# Patient Record
Sex: FEMALE | Race: WHITE | Employment: UNEMPLOYED | URBAN - METROPOLITAN AREA
[De-identification: names, ages, dates, MRNs, and addresses within clinical notes are randomized per-mention and may not be internally consistent; named-entity substitution may affect disease eponyms.]

---

## 2017-05-15 VITALS — WEIGHT: 155 LBS | BODY MASS INDEX: 31.25 KG/M2 | HEIGHT: 59 IN

## 2017-05-15 RX ORDER — BUTALBITAL, ASPIRIN, AND CAFFEINE 325; 50; 40 MG/1; MG/1; MG/1
1 CAPSULE ORAL
COMMUNITY
End: 2019-08-01

## 2017-05-18 ENCOUNTER — ANESTHESIA (OUTPATIENT)
Dept: SURGERY | Age: 52
End: 2017-05-18

## 2017-05-18 ENCOUNTER — HOSPITAL ENCOUNTER (OUTPATIENT)
Age: 52
Setting detail: OUTPATIENT SURGERY
Discharge: HOME | End: 2017-05-18
Attending: ANESTHESIOLOGY | Admitting: ANESTHESIOLOGY

## 2017-05-18 ENCOUNTER — ANESTHESIA EVENT (OUTPATIENT)
Dept: SURGERY | Age: 52
End: 2017-05-18

## 2017-05-18 NOTE — PERIOP NOTES
Dr. Bernetta Landau noticed patient was allergic to Prednisone upon entering medicine for procedure. Dr. Bernetta Landau at bedside talking to patient. Procedure cancelled due to pt allergy to Prednisone.

## 2019-08-01 ENCOUNTER — OFFICE VISIT (OUTPATIENT)
Dept: FAMILY MEDICINE CLINIC | Facility: CLINIC | Age: 54
End: 2019-08-01

## 2019-08-01 VITALS
DIASTOLIC BLOOD PRESSURE: 64 MMHG | WEIGHT: 167.8 LBS | TEMPERATURE: 98.2 F | HEIGHT: 59 IN | BODY MASS INDEX: 33.83 KG/M2 | HEART RATE: 85 BPM | SYSTOLIC BLOOD PRESSURE: 136 MMHG | RESPIRATION RATE: 12 BRPM | OXYGEN SATURATION: 97 %

## 2019-08-01 DIAGNOSIS — Z13.29 THYROID DISORDER SCREEN: ICD-10-CM

## 2019-08-01 DIAGNOSIS — Z98.84 H/O GASTRIC BYPASS: ICD-10-CM

## 2019-08-01 DIAGNOSIS — R60.0 LOWER EXTREMITY EDEMA: ICD-10-CM

## 2019-08-01 DIAGNOSIS — Z13.6 ENCOUNTER FOR LIPID SCREENING FOR CARDIOVASCULAR DISEASE: ICD-10-CM

## 2019-08-01 DIAGNOSIS — J45.40 MODERATE PERSISTENT ASTHMA, UNSPECIFIED WHETHER COMPLICATED: Primary | ICD-10-CM

## 2019-08-01 DIAGNOSIS — Z13.21 ENCOUNTER FOR VITAMIN DEFICIENCY SCREENING: ICD-10-CM

## 2019-08-01 DIAGNOSIS — Z13.220 ENCOUNTER FOR LIPID SCREENING FOR CARDIOVASCULAR DISEASE: ICD-10-CM

## 2019-08-01 DIAGNOSIS — M79.7 FIBROMYALGIA: ICD-10-CM

## 2019-08-01 DIAGNOSIS — M79.10 MYALGIA: ICD-10-CM

## 2019-08-01 DIAGNOSIS — R25.2 MUSCLE CRAMPS: ICD-10-CM

## 2019-08-01 RX ORDER — CYCLOBENZAPRINE HCL 5 MG
5 TABLET ORAL
Qty: 30 TAB | Refills: 0 | Status: SHIPPED | OUTPATIENT
Start: 2019-08-01 | End: 2019-08-01

## 2019-08-01 RX ORDER — CYCLOBENZAPRINE HCL 5 MG
5 TABLET ORAL
Qty: 30 TAB | Refills: 0 | Status: SHIPPED | OUTPATIENT
Start: 2019-08-01 | End: 2020-01-19

## 2019-08-01 NOTE — PROGRESS NOTES
Cathi Greer presents today for   Chief Complaint   Patient presents with   [address]     lmuscle in legs cramps        Is someone accompanying this pt? no    Is the patient using any DME equipment during OV? no    Depression Screening:  3 most recent PHQ Screens 8/1/2019   Little interest or pleasure in doing things Not at all   Feeling down, depressed, irritable, or hopeless Not at all   Total Score PHQ 2 0       Learning Assessment:  Learning Assessment 8/1/2019   PRIMARY LEARNER Patient   HIGHEST LEVEL OF EDUCATION - PRIMARY LEARNER  2 YEARS OF COLLEGE   BARRIERS PRIMARY LEARNER NONE   CO-LEARNER CAREGIVER No   PRIMARY LANGUAGE ENGLISH   LEARNER PREFERENCE PRIMARY DEMONSTRATION   ANSWERED BY patient   RELATIONSHIP SELF       Abuse Screening:  No flowsheet data found. Fall Risk  No flowsheet data found. Health Maintenance reviewed and discussed and ordered per Provider. Health Maintenance Due   Topic Date Due    Hepatitis C Screening  1965    DTaP/Tdap/Td series (1 - Tdap) 09/25/1984    PAP AKA CERVICAL CYTOLOGY  09/25/1984    Shingrix Vaccine Age 50> (1 of 2) 09/25/2013    FOBT Q 1 YEAR AGE 50-75  09/25/2013    BREAST CANCER SCRN MAMMOGRAM  09/05/2015    Influenza Age 9 to Adult  08/01/2019           Coordination of Care:  1. Have you been to the ER, urgent care clinic since your last visit? Hospitalized since your last visit? no    2. Have you seen or consulted any other health care providers outside of the [address] [place] [address] since your last visit? Include any pap smears or colon screening.  no

## 2019-08-01 NOTE — PROGRESS NOTES
Macy Speaker is a 52 y.o. female presenting today for Establish Care (lmuscle in legs cramps )    HPI:  aMcy Burrows presents to the office today to establish care with the practice. Patient has a PMH for asthma, arthritis, migraines, anemia, cervical neck pain with evidence of disc disease and fibromyalgia. She presents today complaining of lower extremity myalgia. She also has intermitten upper extremity myalgia. She denies any known injur and describes the pain and muscle cramps. She reports she has generalized body discomfort most days. She reports, \" I am tired of hurting\". She notes increased pain at night when she is lying down. She has a history of fibromyalgia but does not oumou any medications for her symptoms. Her blood pressure is controlled today and she is negative for chest pain, palpation or dyspnea. She is complaining of head pressure and believes it may be related to her sinus. She is negative for cough or wheezing. She denies any dizziness. Review of Systems   Respiratory: Negative for cough and sputum production. Cardiovascular: Negative for chest pain and palpitations. Gastrointestinal: Negative for abdominal pain, nausea and vomiting. Musculoskeletal: Positive for joint pain and myalgias. Neurological: Positive for headaches (head pressure). Allergies   Allergen Reactions    Imitrex [Sumatriptan Succinate] Other (comments)     \"burned my skin\"    Penicillins Hives    Prednisone Hives       Current Outpatient Medications   Medication Sig Dispense Refill    cyclobenzaprine (FLEXERIL) 5 mg tablet Take 1 Tab by mouth three (3) times daily (with meals). 30 Tab 0    Comp. Stocking,Knee,Regular,Lrg misc 20-30 mm hg 2 Package 1    albuterol (PROVENTIL HFA) 90 mcg/actuation inhaler Take 2 Puffs by inhalation.  naproxen sodium (ALEVE) 220 mg cap Take  by mouth.  meclizine (ANTIVERT) 25 mg tablet Take 1 Tab by mouth three (3) times daily as needed.  9 Tab 0       Past Medical History:   Diagnosis Date    Anemia     Arthritis     Asthma     Cervical neck pain with evidence of disc disease     Fibromyalgia     Migraines        Past Surgical History:   Procedure Laterality Date    ABDOMEN SURGERY PROC UNLISTED      HX CHOLECYSTECTOMY      HX GASTRIC BYPASS      HX GYN      HX HYSTERECTOMY      HX ORTHOPAEDIC      L arm has steel bar 2007       Social History     Socioeconomic History    Marital status: UNKNOWN     Spouse name: Not on file    Number of children: Not on file    Years of education: Not on file    Highest education level: Not on file   Occupational History    Not on file   Social Needs    Financial resource strain: Not on file    Food insecurity:     Worry: Not on file     Inability: Not on file    Transportation needs:     Medical: Not on file     Non-medical: Not on file   Tobacco Use    Smoking status: Former Smoker     Packs/day: 0.50     Last attempt to quit: 11/1/2015     Years since quitting: 3.7    Smokeless tobacco: Never Used   Substance and Sexual Activity    Alcohol use: No    Drug use: No    Sexual activity: Not Currently   Lifestyle    Physical activity:     Days per week: Not on file     Minutes per session: Not on file    Stress: Not on file   Relationships    Social connections:     Talks on phone: Not on file     Gets together: Not on file     Attends [employer] service: Not on file     Active member of club or organization: Not on file     Attends meetings of clubs or organizations: Not on file     Relationship status: Not on file    Intimate partner violence:     Fear of current or ex partner: Not on file     Emotionally abused: Not on file     Physically abused: Not on file     Forced sexual activity: Not on file   Other Topics Concern    Not on file   Social History Narrative    Not on file       Patient does not have an advanced directive on file    Vitals:    08/01/19 1349   BP: 136/64   Pulse: 85   Resp: 12   Temp: 98.2 °F (36.8 °C)   TempSrc: Oral   SpO2: 97%   Weight: 167 lb 12.8 oz (76.1 kg)   Height: 4' 11\" (1.499 m)   PainSc:   7       Physical Exam   Constitutional: She is oriented to person, place, and time. No distress. Cardiovascular: Normal rate, regular rhythm and normal heart sounds. Pulmonary/Chest: Effort normal and breath sounds normal.   Musculoskeletal: She exhibits edema (bilateral ankes). She exhibits no tenderness. Neurological: She is alert and oriented to person, place, and time. Gait normal.   Skin: Skin is warm and dry. Nursing note and vitals reviewed. No visits with results within 3 Month(s) from this visit. Latest known visit with results is:   Admission on 11/22/2015, Discharged on 11/22/2015   Component Date Value Ref Range Status    WBC 11/22/2015 9.5  4.6 - 13.2 K/uL Final    RBC 11/22/2015 4.13* 4.20 - 5.30 M/uL Final    HGB 11/22/2015 9.6* 12.0 - 16.0 g/dL Final    HCT 11/22/2015 31.9* 35.0 - 45.0 % Final    MCV 11/22/2015 77.2  74.0 - 97.0 FL Final    MCH 11/22/2015 23.2* 24.0 - 34.0 PG Final    MCHC 11/22/2015 30.1* 31.0 - 37.0 g/dL Final    RDW 11/22/2015 17.4* 11.6 - 14.5 % Final    PLATELET 46/30/7211 152  135 - 420 K/uL Final    MPV 11/22/2015 10.8  9.2 - 11.8 FL Final    NEUTROPHILS 11/22/2015 70  40 - 73 % Final    LYMPHOCYTES 11/22/2015 21  21 - 52 % Final    MONOCYTES 11/22/2015 7  3 - 10 % Final    EOSINOPHILS 11/22/2015 1  0 - 5 % Final    BASOPHILS 11/22/2015 1  0 - 2 % Final    ABS. NEUTROPHILS 11/22/2015 6.6  1.8 - 8.0 K/UL Final    ABS. LYMPHOCYTES 11/22/2015 2.0  0.9 - 3.6 K/UL Final    ABS. MONOCYTES 11/22/2015 0.7  0.05 - 1.2 K/UL Final    ABS. EOSINOPHILS 11/22/2015 0.1  0.0 - 0.4 K/UL Final    ABS.  BASOPHILS 11/22/2015 0.1* 0.0 - 0.06 K/UL Final    DF 11/22/2015 AUTOMATED    Final    D DIMER 11/22/2015 0.49* <0.46 ug/ml(FEU) Final    Comment: (NOTE)  A D-Dimer result less than 0.5 ug/mL FEU combined with a low clinical   pretest probability of DVT and/or PE has a negative predictive value   of %. The positive predictive value is 50% or less.  CK 11/22/2015 70  26 - 192 U/L Final    CK - MB 11/22/2015 0.7  0.5 - 3.6 ng/ml Final    CK-MB Index 11/22/2015 1.0  0.0 - 4.0 % Final    Troponin-I, QT 11/22/2015 <0.02  0.00 - 0.06 NG/ML Final    Comment: The presence of detectable troponin above the reference range indicates myocardial injury which may be due to ischemia, myocarditis, trauma, etc.  Clinical correlation is necessary to establish the significance of this finding. Sequential testing is recommended to determine if the typical rise and fall of cTnI is demonstrated. Note:  Cardiac troponin I has a relatively long half life and may be present well after the CK MB has returned to baseline. The reference range is based on the 99th percentile of the referent population.  Sodium 11/22/2015 141  136 - 145 mmol/L Final    Potassium 11/22/2015 4.4  3.5 - 5.5 mmol/L Final    Chloride 11/22/2015 108  100 - 108 mmol/L Final    CO2 11/22/2015 25  21 - 32 mmol/L Final    Anion gap 11/22/2015 8  3.0 - 18 mmol/L Final    Glucose 11/22/2015 99  74 - 99 mg/dL Final    BUN 11/22/2015 17  7.0 - 18 MG/DL Final    Creatinine 11/22/2015 0.80  0.6 - 1.3 MG/DL Final    BUN/Creatinine ratio 11/22/2015 21* 12 - 20   Final    GFR est AA 11/22/2015 >60  >60 ml/min/1.73m2 Final    GFR est non-AA 11/22/2015 >60  >60 ml/min/1.73m2 Final    Comment: (NOTE)  Estimated GFR is calculated using the Modification of Diet in Renal   Disease (MDRD) Study equation, reported for both  Americans   (GFRAA) and non- Americans (GFRNA), and normalized to 1.73m2   body surface area. The physician must decide which value applies to   the patient. The MDRD study equation should only be used in   individuals age 25 or older.  It has not been validated for the   following: pregnant women, patients with serious comorbid conditions,   or on certain medications, or persons with extremes of body size,   muscle mass, or nutritional status.  Calcium 11/22/2015 8.6  8.5 - 10.1 MG/DL Final    Ventricular Rate 11/22/2015 67  BPM Final    Atrial Rate 11/22/2015 67  BPM Final    P-R Interval 11/22/2015 114  ms Final    QRS Duration 11/22/2015 74  ms Final    Q-T Interval 11/22/2015 386  ms Final    QTC Calculation (Bezet) 11/22/2015 407  ms Final    Calculated P Axis 11/22/2015 46  degrees Final    Calculated R Axis 11/22/2015 38  degrees Final    Calculated T Axis 11/22/2015 27  degrees Final    Diagnosis 11/22/2015    Final                    Value:Normal sinus rhythm  Normal ECG  When compared with ECG of 23-JUN-2015 15:24,  No significant change was found  Confirmed by [id number], Elvin Bello MD ([id number]),  Carline Cummins ([id number]) on   11/23/2015 6:22:01 AM      Color 11/22/2015 YELLOW    Final    Appearance 11/22/2015 CLEAR    Final    Specific gravity 11/22/2015 1.020  1.003 - 1.030   Final    pH (UA) 11/22/2015 5.5  5.0 - 8.0   Final    Protein 11/22/2015 NEGATIVE   NEG mg/dL Final    Glucose 11/22/2015 NEGATIVE   NEG mg/dL Final    Ketone 11/22/2015 NEGATIVE   NEG mg/dL Final    Bilirubin 11/22/2015 NEGATIVE   NEG   Final    Blood 11/22/2015 TRACE* NEG   Final    Urobilinogen 11/22/2015 0.2  0.2 - 1.0 EU/dL Final    Nitrites 11/22/2015 POSITIVE* NEG   Final    Leukocyte Esterase 11/22/2015 TRACE* NEG   Final    WBC 11/22/2015 1 to 3  0 - 4 /hpf Final    RBC 11/22/2015 0 to 1  0 - 5 /hpf Final    Epithelial cells 11/22/2015 FEW  0 - 5 /lpf Final    Bacteria 11/22/2015 3+* NEG /hpf Final       .No results found for any visits on 08/01/19. Assessment / Plan:      ICD-10-CM ICD-9-CM    1. Moderate persistent asthma, unspecified whether complicated U90.88 937.74    2. Fibromyalgia M79.7 729.1 CANCELED: REFERRAL TO PAIN MANAGEMENT   3.  Encounter for lipid screening for cardiovascular disease V86.675 A03.76 METABOLIC PANEL, COMPREHENSIVE    Z13.6 V81.2 LIPID PANEL      CBC WITH AUTOMATED DIFF   4. Thyroid disorder screen Z13.29 V77.0 TSH 3RD GENERATION   5. Encounter for vitamin deficiency screening Z13.21 V77.99 VITAMIN D, 25 HYDROXY   6. Myalgia M79.10 729.1 SED RATE (ESR)      C REACTIVE PROTEIN, QT   7. Lower extremity edema R60.0 782. 3 Comp. Stocking,Knee,Regular,Lrg misc      DISCONTINUED: Comp. Stocking,Knee,Regular,Lrg misc   8. Muscle cramps R25.2 729.82 cyclobenzaprine (FLEXERIL) 5 mg tablet      DISCONTINUED: cyclobenzaprine (FLEXERIL) 5 mg tablet   9. H/O gastric bypass Z98.84 V45.86 FERRITIN     Follow-up and Dispositions    · Return in about 1 month (around 8/29/2019). Fibromyalgia- patient declined referral to pain management  Fasting labs ordered  Screen for TSH  H/o gastric bypass- feritin ordered- patient instructed to avoid NSAIDS (Aleve, advil, Motrin)  Sed Rate and CRP ordered      I asked the patient if she  had any questions and answered her  questions. The patient stated that she understands the treatment plan and agrees with the treatment plan    This document was created with a voice activated dictation system and may contain transcription errors.

## 2019-08-22 ENCOUNTER — HOSPITAL ENCOUNTER (OUTPATIENT)
Dept: LAB | Age: 54
Discharge: HOME | End: 2019-08-22

## 2019-08-22 LAB — XX-LABCORP SPECIMEN COL,LCBCF: NORMAL

## 2019-08-22 PROCEDURE — 99001 SPECIMEN HANDLING PT-LAB: CPT

## 2019-08-23 LAB
25(OH)D3+25(OH)D2 SERPL-MCNC: 26.5 NG/ML (ref 30–100)
ALBUMIN SERPL-MCNC: 3.7 G/DL (ref 3.5–5.5)
ALBUMIN/GLOB SERPL: 1.4 {RATIO} (ref 1.2–2.2)
ALP SERPL-CCNC: 109 IU/L (ref 39–117)
ALT SERPL-CCNC: 27 IU/L (ref 0–32)
AST SERPL-CCNC: 30 IU/L (ref 0–40)
BASOPHILS # BLD AUTO: 0 X10E3/UL (ref 0–0.2)
BASOPHILS NFR BLD AUTO: 0 %
BILIRUB SERPL-MCNC: 0.3 MG/DL (ref 0–1.2)
BUN SERPL-MCNC: 13 MG/DL (ref 6–24)
BUN/CREAT SERPL: 22 (ref 9–23)
CALCIUM SERPL-MCNC: 8.4 MG/DL (ref 8.7–10.2)
CHLORIDE SERPL-SCNC: 106 MMOL/L (ref 96–106)
CHOLEST SERPL-MCNC: 115 MG/DL (ref 100–199)
CO2 SERPL-SCNC: 23 MMOL/L (ref 20–29)
CREAT SERPL-MCNC: 0.59 MG/DL (ref 0.57–1)
CRP SERPL-MCNC: <1 MG/L (ref 0–10)
EOSINOPHIL # BLD AUTO: 0.1 X10E3/UL (ref 0–0.4)
EOSINOPHIL NFR BLD AUTO: 1 %
ERYTHROCYTE [DISTWIDTH] IN BLOOD BY AUTOMATED COUNT: 16 % (ref 12.3–15.4)
ERYTHROCYTE [SEDIMENTATION RATE] IN BLOOD BY WESTERGREN METHOD: 40 MM/HR (ref 0–40)
FERRITIN SERPL-MCNC: 6 NG/ML (ref 15–150)
GLOBULIN SER CALC-MCNC: 2.7 G/DL (ref 1.5–4.5)
GLUCOSE SERPL-MCNC: 92 MG/DL (ref 65–99)
HCT VFR BLD AUTO: 31.2 % (ref 34–46.6)
HDLC SERPL-MCNC: 33 MG/DL
HGB BLD-MCNC: 8.7 G/DL (ref 11.1–15.9)
IMM GRANULOCYTES # BLD AUTO: 0 X10E3/UL (ref 0–0.1)
IMM GRANULOCYTES NFR BLD AUTO: 0 %
LDLC SERPL CALC-MCNC: 71 MG/DL (ref 0–99)
LYMPHOCYTES # BLD AUTO: 1.7 X10E3/UL (ref 0.7–3.1)
LYMPHOCYTES NFR BLD AUTO: 24 %
MCH RBC QN AUTO: 21.8 PG (ref 26.6–33)
MCHC RBC AUTO-ENTMCNC: 27.9 G/DL (ref 31.5–35.7)
MCV RBC AUTO: 78 FL (ref 79–97)
MONOCYTES # BLD AUTO: 0.5 X10E3/UL (ref 0.1–0.9)
MONOCYTES NFR BLD AUTO: 6 %
NEUTROPHILS # BLD AUTO: 5 X10E3/UL (ref 1.4–7)
NEUTROPHILS NFR BLD AUTO: 69 %
PLATELET # BLD AUTO: 378 X10E3/UL (ref 150–450)
POTASSIUM SERPL-SCNC: 4.4 MMOL/L (ref 3.5–5.2)
PROT SERPL-MCNC: 6.4 G/DL (ref 6–8.5)
RBC # BLD AUTO: 4 X10E6/UL (ref 3.77–5.28)
SODIUM SERPL-SCNC: 143 MMOL/L (ref 134–144)
SPECIMEN STATUS REPORT, ROLRST: NORMAL
TRIGL SERPL-MCNC: 56 MG/DL (ref 0–149)
TSH SERPL DL<=0.005 MIU/L-ACNC: 3.18 UIU/ML (ref 0.45–4.5)
VLDLC SERPL CALC-MCNC: 11 MG/DL (ref 5–40)
WBC # BLD AUTO: 7.3 X10E3/UL (ref 3.4–10.8)

## 2019-08-28 DIAGNOSIS — D50.9 IRON DEFICIENCY ANEMIA, UNSPECIFIED IRON DEFICIENCY ANEMIA TYPE: Primary | ICD-10-CM

## 2019-08-28 RX ORDER — LANOLIN ALCOHOL/MO/W.PET/CERES
325 CREAM (GRAM) TOPICAL 2 TIMES DAILY
Qty: 60 TAB | Refills: 2 | Status: SHIPPED | OUTPATIENT
Start: 2019-08-28 | End: 2020-01-19

## 2019-08-28 NOTE — PROGRESS NOTES
Please let the patient know I would like to see her in the office today or the beginning of next week to discuss labs.  . Ferrous Sulfate called to her pharmacist.

## 2019-10-29 ENCOUNTER — APPOINTMENT (OUTPATIENT)
Dept: GENERAL RADIOLOGY | Age: 54
End: 2019-10-29
Attending: EMERGENCY MEDICINE
Payer: COMMERCIAL

## 2019-10-29 ENCOUNTER — HOSPITAL ENCOUNTER (EMERGENCY)
Age: 54
Discharge: HOME | End: 2019-10-29
Attending: EMERGENCY MEDICINE
Payer: COMMERCIAL

## 2019-10-29 VITALS
OXYGEN SATURATION: 100 % | TEMPERATURE: 98.5 F | WEIGHT: 160 LBS | DIASTOLIC BLOOD PRESSURE: 75 MMHG | SYSTOLIC BLOOD PRESSURE: 152 MMHG | BODY MASS INDEX: 32.32 KG/M2 | RESPIRATION RATE: 18 BRPM | HEART RATE: 74 BPM

## 2019-10-29 DIAGNOSIS — R07.89 CHEST WALL PAIN: Primary | ICD-10-CM

## 2019-10-29 LAB
ALBUMIN SERPL-MCNC: 3.4 G/DL (ref 3.4–5)
ALBUMIN/GLOB SERPL: 0.9 {RATIO} (ref 0.8–1.7)
ALP SERPL-CCNC: 105 U/L (ref 45–117)
ALT SERPL-CCNC: 50 U/L (ref 13–56)
ANION GAP SERPL CALC-SCNC: 6 MMOL/L (ref 3–18)
AST SERPL-CCNC: 34 U/L (ref 10–38)
ATRIAL RATE: 86 BPM
BASOPHILS # BLD: 0 K/UL (ref 0–0.1)
BASOPHILS NFR BLD: 1 % (ref 0–2)
BILIRUB SERPL-MCNC: 0.2 MG/DL (ref 0.2–1)
BUN SERPL-MCNC: 15 MG/DL (ref 7–18)
BUN/CREAT SERPL: 18 (ref 12–20)
CALCIUM SERPL-MCNC: 8.4 MG/DL (ref 8.5–10.1)
CALCULATED P AXIS, ECG09: 80 DEGREES
CALCULATED R AXIS, ECG10: 29 DEGREES
CALCULATED T AXIS, ECG11: 16 DEGREES
CHLORIDE SERPL-SCNC: 113 MMOL/L (ref 100–111)
CO2 SERPL-SCNC: 24 MMOL/L (ref 21–32)
CREAT SERPL-MCNC: 0.84 MG/DL (ref 0.6–1.3)
DIAGNOSIS, 93000: NORMAL
DIFFERENTIAL METHOD BLD: ABNORMAL
EOSINOPHIL # BLD: 0.1 K/UL (ref 0–0.4)
EOSINOPHIL NFR BLD: 1 % (ref 0–5)
ERYTHROCYTE [DISTWIDTH] IN BLOOD BY AUTOMATED COUNT: 15.8 % (ref 11.6–14.5)
GLOBULIN SER CALC-MCNC: 4 G/DL (ref 2–4)
GLUCOSE SERPL-MCNC: 96 MG/DL (ref 74–99)
HCT VFR BLD AUTO: 27.8 % (ref 35–45)
HGB BLD-MCNC: 8 G/DL (ref 12–16)
LYMPHOCYTES # BLD: 1.8 K/UL (ref 0.9–3.6)
LYMPHOCYTES NFR BLD: 22 % (ref 21–52)
MAGNESIUM SERPL-MCNC: 2 MG/DL (ref 1.6–2.6)
MCH RBC QN AUTO: 20.7 PG (ref 24–34)
MCHC RBC AUTO-ENTMCNC: 28.8 G/DL (ref 31–37)
MCV RBC AUTO: 71.8 FL (ref 74–97)
MONOCYTES # BLD: 0.5 K/UL (ref 0.05–1.2)
MONOCYTES NFR BLD: 6 % (ref 3–10)
NEUTS SEG # BLD: 5.5 K/UL (ref 1.8–8)
NEUTS SEG NFR BLD: 70 % (ref 40–73)
P-R INTERVAL, ECG05: 116 MS
PLATELET # BLD AUTO: 396 K/UL (ref 135–420)
PMV BLD AUTO: 10.1 FL (ref 9.2–11.8)
POTASSIUM SERPL-SCNC: 4.2 MMOL/L (ref 3.5–5.5)
PROT SERPL-MCNC: 7.4 G/DL (ref 6.4–8.2)
Q-T INTERVAL, ECG07: 378 MS
QRS DURATION, ECG06: 70 MS
QTC CALCULATION (BEZET), ECG08: 452 MS
RBC # BLD AUTO: 3.87 M/UL (ref 4.2–5.3)
SODIUM SERPL-SCNC: 143 MMOL/L (ref 136–145)
TROPONIN I SERPL-MCNC: <0.02 NG/ML (ref 0–0.04)
VENTRICULAR RATE, ECG03: 86 BPM
WBC # BLD AUTO: 7.9 K/UL (ref 4.6–13.2)

## 2019-10-29 PROCEDURE — 71045 X-RAY EXAM CHEST 1 VIEW: CPT

## 2019-10-29 PROCEDURE — 93005 ELECTROCARDIOGRAM TRACING: CPT

## 2019-10-29 PROCEDURE — 84484 ASSAY OF TROPONIN QUANT: CPT

## 2019-10-29 PROCEDURE — 83735 ASSAY OF MAGNESIUM: CPT

## 2019-10-29 PROCEDURE — 99283 EMERGENCY DEPT VISIT LOW MDM: CPT

## 2019-10-29 PROCEDURE — 80053 COMPREHEN METABOLIC PANEL: CPT

## 2019-10-29 PROCEDURE — 85025 COMPLETE CBC W/AUTO DIFF WBC: CPT

## 2019-10-29 PROCEDURE — 94760 N-INVAS EAR/PLS OXIMETRY 1: CPT

## 2019-10-29 RX ORDER — GUAIFENESIN 100 MG/5ML
324 LIQUID (ML) ORAL
Status: DISCONTINUED | OUTPATIENT
Start: 2019-10-29 | End: 2019-10-29 | Stop reason: HOSPADM

## 2019-10-29 NOTE — DISCHARGE INSTRUCTIONS

## 2019-10-29 NOTE — ED NOTES
I performed a brief evaluation, including history and physical, of the patient here in triage and I have determined that pt will need further treatment and evaluation from the main side ER physician. I have placed initial orders to help in expediting patients care. October 29, 2019 at 2:54 PM - BOZENA Aguero        There were no vitals taken for this visit.

## 2019-10-29 NOTE — ED PROVIDER NOTES
EMERGENCY DEPARTMENT HISTORY AND PHYSICAL EXAM    5:38 PM      Date: 10/29/2019  Patient Name: Efra Duckworth    History of Presenting Illness     Chief Complaint   Patient presents with    Chest Pain    Shortness of Breath     History Provided By: patient    Additional History (Context): Efra Duckworth is a 62 y.o. female presents with chest pain for several days. Last 5 to 10 minutes comes on without exertion nonpleuritic. Last 5 to 10 minutes and resolve spontaneously. No prior heart history or VTE. At the time of the exam patient is pain-free. She has a tender placed on her left chest that reproduces her pain. .. PCP: Enriqueta Leal MD    Chief Complaint:   Duration:    Timing:    Location:   Quality:   Severity:   Modifying Factors:   Associated Symptoms:       Current Facility-Administered Medications   Medication Dose Route Frequency Provider Last Rate Last Dose    aspirin chewable tablet 324 mg  324 mg Oral NOW Caroline Fernandez PA         Current Outpatient Medications   Medication Sig Dispense Refill    ferrous sulfate 325 mg (65 mg iron) tablet Take 1 Tab by mouth two (2) times a day. 60 Tab 2    cyclobenzaprine (FLEXERIL) 5 mg tablet Take 1 Tab by mouth three (3) times daily (with meals). 30 Tab 0    Comp. Stocking,Knee,Regular,Lrg misc 20-30 mm hg 2 Package 1    albuterol (PROVENTIL HFA) 90 mcg/actuation inhaler Take 2 Puffs by inhalation.  naproxen sodium (ALEVE) 220 mg cap Take  by mouth.  meclizine (ANTIVERT) 25 mg tablet Take 1 Tab by mouth three (3) times daily as needed.  9 Tab 0       Past History     Past Medical History:  Past Medical History:   Diagnosis Date    Anemia     Arthritis     Asthma     Cervical neck pain with evidence of disc disease     Fibromyalgia     Migraines        Past Surgical History:  Past Surgical History:   Procedure Laterality Date    ABDOMEN SURGERY PROC UNLISTED      HX CHOLECYSTECTOMY      HX GASTRIC BYPASS      HX GYN      HX HYSTERECTOMY  HX ORTHOPAEDIC      L arm has steel bar 2007       Family History:  Family History   Problem Relation Age of Onset    Cancer Mother     Heart Disease Sister     Heart Disease Father        Social History:  Social History     Tobacco Use    Smoking status: Former Smoker     Packs/day: 0.50     Last attempt to quit: 11/1/2015     Years since quitting: 3.9    Smokeless tobacco: Never Used   Substance Use Topics    Alcohol use: No    Drug use: No       Allergies: Allergies   Allergen Reactions    Imitrex [Sumatriptan Succinate] Other (comments)     \"burned my skin\"    Penicillins Hives    Prednisone Hives         Review of Systems     Review of Systems   Constitutional: Negative for diaphoresis and fever. HENT: Negative for congestion and sore throat. Eyes: Negative for pain and itching. Respiratory: Negative for cough and shortness of breath. Cardiovascular: Positive for chest pain. Negative for palpitations. Gastrointestinal: Negative for abdominal pain and diarrhea. Endocrine: Negative for polydipsia and polyuria. Genitourinary: Negative for dysuria and hematuria. Musculoskeletal: Negative for arthralgias and myalgias. Skin: Negative for rash and wound. Neurological: Negative for seizures and syncope. Hematological: Does not bruise/bleed easily. Psychiatric/Behavioral: Negative for agitation and hallucinations. Physical Exam       Patient Vitals for the past 12 hrs:   Temp Pulse Resp BP SpO2   10/29/19 1730  74 18 152/75 100 %   10/29/19 1507 98.5 °F (36.9 °C) 100 20 140/86 100 %       Physical Exam   Constitutional: She appears well-developed and well-nourished. HENT:   Head: Normocephalic and atraumatic. Eyes: Conjunctivae are normal. No scleral icterus. Neck: Normal range of motion. Neck supple. No JVD present. Cardiovascular: Normal rate, regular rhythm and normal heart sounds.    4 intact extremity pulses   Pulmonary/Chest: Effort normal and breath sounds normal. She exhibits tenderness (About the fourth rib from midclavicular line to mid axillary line on the left side). Abdominal: Soft. She exhibits no mass. There is no tenderness. Musculoskeletal: Normal range of motion. Lymphadenopathy:     She has no cervical adenopathy. Neurological: She is alert. Skin: Skin is warm and dry. Nursing note and vitals reviewed. Diagnostic Study Results   Labs -  Recent Results (from the past 12 hour(s))   EKG, 12 LEAD, INITIAL    Collection Time: 10/29/19  2:57 PM   Result Value Ref Range    Ventricular Rate 86 BPM    Atrial Rate 86 BPM    P-R Interval 116 ms    QRS Duration 70 ms    Q-T Interval 378 ms    QTC Calculation (Bezet) 452 ms    Calculated P Axis 80 degrees    Calculated R Axis 29 degrees    Calculated T Axis 16 degrees    Diagnosis       Normal sinus rhythm  Low voltage QRS  Borderline ECG  When compared with ECG of 22-NOV-2015 17:23,  No significant change was found  Confirmed by Christine Webb MD, Radha Olmstead ([id number]) on 10/29/2019 5:90:15 PM     METABOLIC PANEL, COMPREHENSIVE    Collection Time: 10/29/19  3:07 PM   Result Value Ref Range    Sodium 143 136 - 145 mmol/L    Potassium 4.2 3.5 - 5.5 mmol/L    Chloride 113 (H) 100 - 111 mmol/L    CO2 24 21 - 32 mmol/L    Anion gap 6 3.0 - 18 mmol/L    Glucose 96 74 - 99 mg/dL    BUN 15 7.0 - 18 MG/DL    Creatinine 0.84 0.6 - 1.3 MG/DL    BUN/Creatinine ratio 18 12 - 20      GFR est AA >60 >60 ml/min/1.73m2    GFR est non-AA >60 >60 ml/min/1.73m2    Calcium 8.4 (L) 8.5 - 10.1 MG/DL    Bilirubin, total 0.2 0.2 - 1.0 MG/DL    ALT (SGPT) 50 13 - 56 U/L    AST (SGOT) 34 10 - 38 U/L    Alk.  phosphatase 105 45 - 117 U/L    Protein, total 7.4 6.4 - 8.2 g/dL    Albumin 3.4 3.4 - 5.0 g/dL    Globulin 4.0 2.0 - 4.0 g/dL    A-G Ratio 0.9 0.8 - 1.7     CBC WITH AUTOMATED DIFF    Collection Time: 10/29/19  3:07 PM   Result Value Ref Range    WBC 7.9 4.6 - 13.2 K/uL    RBC 3.87 (L) 4.20 - 5.30 M/uL    HGB 8.0 (L) 12.0 - 16.0 g/dL HCT 27.8 (L) 35.0 - 45.0 %    MCV 71.8 (L) 74.0 - 97.0 FL    MCH 20.7 (L) 24.0 - 34.0 PG    MCHC 28.8 (L) 31.0 - 37.0 g/dL    RDW 15.8 (H) 11.6 - 14.5 %    PLATELET 449 013 - 294 K/uL    MPV 10.1 9.2 - 11.8 FL    NEUTROPHILS 70 40 - 73 %    LYMPHOCYTES 22 21 - 52 %    MONOCYTES 6 3 - 10 %    EOSINOPHILS 1 0 - 5 %    BASOPHILS 1 0 - 2 %    ABS. NEUTROPHILS 5.5 1.8 - 8.0 K/UL    ABS. LYMPHOCYTES 1.8 0.9 - 3.6 K/UL    ABS. MONOCYTES 0.5 0.05 - 1.2 K/UL    ABS. EOSINOPHILS 0.1 0.0 - 0.4 K/UL    ABS. BASOPHILS 0.0 0.0 - 0.1 K/UL    DF AUTOMATED     TROPONIN I    Collection Time: 10/29/19  3:07 PM   Result Value Ref Range    Troponin-I, QT <0.02 0.0 - 0.045 NG/ML   MAGNESIUM    Collection Time: 10/29/19  3:07 PM   Result Value Ref Range    Magnesium 2.0 1.6 - 2.6 mg/dL       Radiologic Studies -   XR CHEST SNGL V   Final Result   Impression:      No radiographic evidence of acute cardiopulmonary process. Xr Chest Sngl V    Result Date: 10/29/2019  EXAM:  XR CHEST SNGL V INDICATION:   Chest Pain COMPARISON: 12/23/2015. FINDINGS: The cardiac and mediastinal silhouette are within normal limits. Pulmonary vasculature is within normal limits. No pneumothorax or pleural effusions. No air space opacity. No acute osseous abnormality. Impression: No radiographic evidence of acute cardiopulmonary process. Medications ordered:   Medications   aspirin chewable tablet 324 mg (has no administration in time range)         Medical Decision Making   Initial Medical Decision Making and DDx:  Most consistent with rib or muscular pain. She has a history of fibromyalgia so this may be relating to it. Given the time course and several days without alarming findings including negative troponin, do not suspect aortic disease PE ACS pneumonia pneumothorax.   Notably, she was admitted on 10/17/2019 to another hospital for cardiac work-up finishing with a negative nuclear medicine stress test.    ED Course: Progress Notes, Reevaluation, and Consults:         I am the first provider for this patient. I reviewed the vital signs, available nursing notes, past medical history, past surgical history, family history and social history. Patient Vitals for the past 12 hrs:   Temp Pulse Resp BP SpO2   10/29/19 1730  74 18 152/75 100 %   10/29/19 1507 98.5 °F (36.9 °C) 100 20 140/86 100 %       Vital Signs-Reviewed the patient's vital signs. Pulse Oximetry Analysis, Cardiac Monitor, 12 lead ekg:  Twelve-lead EKG at 257, sinus rhythm at 86 no acute process. Telemetry sinus rhythm at 100. Oximetry 100% on room air  Interpreted by the EP. Records Reviewed: Nursing notes reviewed (Time of Review: 5:38 PM)    Procedures:   Critical Care Time:   Aspirin: (was aspirin given for stroke?)    Diagnosis     Clinical Impression:   1. Chest wall pain        Disposition: Discharged      Follow-up Information     Follow up With Specialties Details Why Contact Info    [facility] EMERGENCY DEPT Emergency Medicine   [id number] [facility] [address]  [address] [place] [address] Drive    [address] [place] [address] [address] [place] [address]  [phone]           Patient's Medications   Start Taking    No medications on file   Continue Taking    ALBUTEROL (PROVENTIL HFA) 90 MCG/ACTUATION INHALER    Take 2 Puffs by inhalation. COMP. STOCKING,KNEE,REGULAR,LRG MISC    20-30 mm hg    CYCLOBENZAPRINE (FLEXERIL) 5 MG TABLET    Take 1 Tab by mouth three (3) times daily (with meals). FERROUS SULFATE 325 MG (65 MG IRON) TABLET    Take 1 Tab by mouth two (2) times a day. MECLIZINE (ANTIVERT) 25 MG TABLET    Take 1 Tab by mouth three (3) times daily as needed. NAPROXEN SODIUM (ALEVE) 220 MG CAP    Take  by mouth.    These Medications have changed    No medications on file   Stop Taking    No medications on file     _______________________________    Notes:    Gómez Dowd MD using Dragon dictation      _______________________________

## 2019-10-29 NOTE — ED TRIAGE NOTES
\"My chest hurt and I'm having pain in my left arm. \" Reports symptoms started 2 days ago. Pt.  Also c/o N/V.

## 2019-10-29 NOTE — LETTER
NOTIFICATION RETURN TO WORK / SCHOOL 
 
10/29/2019 5:56 PM 
 
Ms. Darren Ahumada [id number] [facility] [id number] To Whom It May Concern: 
 
Darren Ahumada is currently under the care of [facility] EMERGENCY DEPT. She will return to work/school on: 10/31/19 If there are questions or concerns please have the patient contact our office. Sincerely, Lucero Gonsales MD

## 2020-01-18 ENCOUNTER — APPOINTMENT (OUTPATIENT)
Dept: CT IMAGING | Age: 55
DRG: 812 | End: 2020-01-18
Attending: NURSE PRACTITIONER
Payer: COMMERCIAL

## 2020-01-18 ENCOUNTER — HOSPITAL ENCOUNTER (INPATIENT)
Age: 55
LOS: 2 days | Discharge: HOME | DRG: 812 | End: 2020-01-21
Attending: EMERGENCY MEDICINE | Admitting: HOSPITALIST
Payer: COMMERCIAL

## 2020-01-18 ENCOUNTER — APPOINTMENT (OUTPATIENT)
Dept: GENERAL RADIOLOGY | Age: 55
DRG: 812 | End: 2020-01-18
Attending: EMERGENCY MEDICINE
Payer: COMMERCIAL

## 2020-01-18 DIAGNOSIS — R51.9 NONINTRACTABLE HEADACHE, UNSPECIFIED CHRONICITY PATTERN, UNSPECIFIED HEADACHE TYPE: ICD-10-CM

## 2020-01-18 DIAGNOSIS — R29.898 WEAKNESS OF LEFT UPPER EXTREMITY: Primary | ICD-10-CM

## 2020-01-18 LAB
ALBUMIN SERPL-MCNC: 3.5 G/DL (ref 3.4–5)
ALBUMIN/GLOB SERPL: 0.8 {RATIO} (ref 0.8–1.7)
ALP SERPL-CCNC: 118 U/L (ref 45–117)
ALT SERPL-CCNC: 27 U/L (ref 13–56)
AMPHET UR QL SCN: NEGATIVE
ANION GAP SERPL CALC-SCNC: 7 MMOL/L (ref 3–18)
APPEARANCE UR: CLEAR
AST SERPL-CCNC: 25 U/L (ref 10–38)
BACTERIA URNS QL MICRO: ABNORMAL /HPF
BARBITURATES UR QL SCN: NEGATIVE
BASOPHILS # BLD: 0 K/UL (ref 0–0.1)
BASOPHILS NFR BLD: 0 % (ref 0–2)
BENZODIAZ UR QL: NEGATIVE
BILIRUB SERPL-MCNC: 0.2 MG/DL (ref 0.2–1)
BILIRUB UR QL: NEGATIVE
BUN SERPL-MCNC: 14 MG/DL (ref 7–18)
BUN/CREAT SERPL: 15 (ref 12–20)
CALCIUM SERPL-MCNC: 8.2 MG/DL (ref 8.5–10.1)
CANNABINOIDS UR QL SCN: NEGATIVE
CHLORIDE SERPL-SCNC: 112 MMOL/L (ref 100–111)
CK MB CFR SERPL CALC: 1.2 % (ref 0–4)
CK MB SERPL-MCNC: 1.4 NG/ML (ref 5–25)
CK SERPL-CCNC: 118 U/L (ref 26–192)
CO2 SERPL-SCNC: 22 MMOL/L (ref 21–32)
COCAINE UR QL SCN: NEGATIVE
COLOR UR: YELLOW
CREAT SERPL-MCNC: 0.95 MG/DL (ref 0.6–1.3)
DIFFERENTIAL METHOD BLD: ABNORMAL
EOSINOPHIL # BLD: 0.1 K/UL (ref 0–0.4)
EOSINOPHIL NFR BLD: 1 % (ref 0–5)
EPITH CASTS URNS QL MICRO: ABNORMAL /LPF (ref 0–5)
ERYTHROCYTE [DISTWIDTH] IN BLOOD BY AUTOMATED COUNT: 17.4 % (ref 11.6–14.5)
GLOBULIN SER CALC-MCNC: 4.4 G/DL (ref 2–4)
GLUCOSE SERPL-MCNC: 91 MG/DL (ref 74–99)
GLUCOSE UR STRIP.AUTO-MCNC: NEGATIVE MG/DL
HCT VFR BLD AUTO: 28.4 % (ref 35–45)
HDSCOM,HDSCOM: NORMAL
HGB BLD-MCNC: 8 G/DL (ref 12–16)
HGB UR QL STRIP: ABNORMAL
INR PPP: 1 (ref 0.8–1.2)
KETONES UR QL STRIP.AUTO: NEGATIVE MG/DL
LEUKOCYTE ESTERASE UR QL STRIP.AUTO: ABNORMAL
LYMPHOCYTES # BLD: 2 K/UL (ref 0.9–3.6)
LYMPHOCYTES NFR BLD: 20 % (ref 21–52)
MCH RBC QN AUTO: 19 PG (ref 24–34)
MCHC RBC AUTO-ENTMCNC: 28.2 G/DL (ref 31–37)
MCV RBC AUTO: 67.5 FL (ref 74–97)
METHADONE UR QL: NEGATIVE
MONOCYTES # BLD: 0.6 K/UL (ref 0.05–1.2)
MONOCYTES NFR BLD: 6 % (ref 3–10)
NEUTS SEG # BLD: 7.1 K/UL (ref 1.8–8)
NEUTS SEG NFR BLD: 73 % (ref 40–73)
NITRITE UR QL STRIP.AUTO: POSITIVE
OPIATES UR QL: NEGATIVE
PCP UR QL: NEGATIVE
PH UR STRIP: 5.5 [PH] (ref 5–8)
PLATELET # BLD AUTO: 421 K/UL (ref 135–420)
PMV BLD AUTO: 9.5 FL (ref 9.2–11.8)
POTASSIUM SERPL-SCNC: 3.5 MMOL/L (ref 3.5–5.5)
PROT SERPL-MCNC: 7.9 G/DL (ref 6.4–8.2)
PROT UR STRIP-MCNC: NEGATIVE MG/DL
PROTHROMBIN TIME: 12.5 SEC (ref 11.5–15.2)
RBC # BLD AUTO: 4.21 M/UL (ref 4.2–5.3)
RBC #/AREA URNS HPF: ABNORMAL /HPF (ref 0–5)
SODIUM SERPL-SCNC: 141 MMOL/L (ref 136–145)
SP GR UR REFRACTOMETRY: 1.01 (ref 1–1.03)
TROPONIN I SERPL-MCNC: <0.02 NG/ML (ref 0–0.04)
UROBILINOGEN UR QL STRIP.AUTO: 1 EU/DL (ref 0.2–1)
WBC # BLD AUTO: 9.9 K/UL (ref 4.6–13.2)
WBC URNS QL MICRO: ABNORMAL /HPF (ref 0–5)

## 2020-01-18 PROCEDURE — 74011250637 HC RX REV CODE- 250/637: Performed by: EMERGENCY MEDICINE

## 2020-01-18 PROCEDURE — 81001 URINALYSIS AUTO W/SCOPE: CPT

## 2020-01-18 PROCEDURE — 99285 EMERGENCY DEPT VISIT HI MDM: CPT

## 2020-01-18 PROCEDURE — 85025 COMPLETE CBC W/AUTO DIFF WBC: CPT

## 2020-01-18 PROCEDURE — 96375 TX/PRO/DX INJ NEW DRUG ADDON: CPT

## 2020-01-18 PROCEDURE — 80053 COMPREHEN METABOLIC PANEL: CPT

## 2020-01-18 PROCEDURE — 85610 PROTHROMBIN TIME: CPT

## 2020-01-18 PROCEDURE — 71045 X-RAY EXAM CHEST 1 VIEW: CPT

## 2020-01-18 PROCEDURE — 80307 DRUG TEST PRSMV CHEM ANLYZR: CPT

## 2020-01-18 PROCEDURE — 93005 ELECTROCARDIOGRAM TRACING: CPT

## 2020-01-18 PROCEDURE — 82550 ASSAY OF CK (CPK): CPT

## 2020-01-18 PROCEDURE — 70450 CT HEAD/BRAIN W/O DYE: CPT

## 2020-01-18 PROCEDURE — 96374 THER/PROPH/DIAG INJ IV PUSH: CPT

## 2020-01-18 PROCEDURE — 74011250636 HC RX REV CODE- 250/636: Performed by: EMERGENCY MEDICINE

## 2020-01-18 RX ORDER — DIPHENHYDRAMINE HYDROCHLORIDE 50 MG/ML
25 INJECTION, SOLUTION INTRAMUSCULAR; INTRAVENOUS ONCE
Status: COMPLETED | OUTPATIENT
Start: 2020-01-18 | End: 2020-01-18

## 2020-01-18 RX ORDER — PROCHLORPERAZINE EDISYLATE 5 MG/ML
10 INJECTION INTRAMUSCULAR; INTRAVENOUS
Status: COMPLETED | OUTPATIENT
Start: 2020-01-18 | End: 2020-01-18

## 2020-01-18 RX ORDER — GUAIFENESIN 100 MG/5ML
324 LIQUID (ML) ORAL
Status: COMPLETED | OUTPATIENT
Start: 2020-01-18 | End: 2020-01-18

## 2020-01-18 RX ORDER — CEFTRIAXONE 1 G/1
1 INJECTION, POWDER, FOR SOLUTION INTRAMUSCULAR; INTRAVENOUS
Status: DISCONTINUED | OUTPATIENT
Start: 2020-01-18 | End: 2020-01-19

## 2020-01-18 RX ADMIN — DIPHENHYDRAMINE HYDROCHLORIDE 25 MG: 50 INJECTION INTRAMUSCULAR; INTRAVENOUS at 23:09

## 2020-01-18 RX ADMIN — PROCHLORPERAZINE EDISYLATE 10 MG: 5 INJECTION INTRAMUSCULAR; INTRAVENOUS at 23:08

## 2020-01-18 RX ADMIN — ASPIRIN 81 MG 324 MG: 81 TABLET ORAL at 22:27

## 2020-01-19 PROBLEM — M79.7 FIBROMYALGIA: Status: ACTIVE | Noted: 2017-10-13

## 2020-01-19 PROBLEM — G45.9 TIA (TRANSIENT ISCHEMIC ATTACK): Status: ACTIVE | Noted: 2020-01-19

## 2020-01-19 PROBLEM — R11.2 NAUSEA AND VOMITING: Status: ACTIVE | Noted: 2020-01-19

## 2020-01-19 PROBLEM — G47.62 NOCTURNAL LEG CRAMPS: Chronic | Status: ACTIVE | Noted: 2020-01-19

## 2020-01-19 PROBLEM — R29.898 WEAKNESS OF LEFT UPPER EXTREMITY: Status: ACTIVE | Noted: 2020-01-19

## 2020-01-19 PROBLEM — I24.9 ACUTE CORONARY SYNDROME (HCC): Status: ACTIVE | Noted: 2017-10-13

## 2020-01-19 PROBLEM — N39.0 UTI (URINARY TRACT INFECTION): Status: ACTIVE | Noted: 2020-01-19

## 2020-01-19 PROBLEM — G47.62 NOCTURNAL LEG CRAMPS: Status: ACTIVE | Noted: 2020-01-19

## 2020-01-19 LAB
ATRIAL RATE: 96 BPM
CALCULATED P AXIS, ECG09: 57 DEGREES
CALCULATED R AXIS, ECG10: 26 DEGREES
CALCULATED T AXIS, ECG11: 17 DEGREES
DIAGNOSIS, 93000: NORMAL
P-R INTERVAL, ECG05: 154 MS
Q-T INTERVAL, ECG07: 376 MS
QRS DURATION, ECG06: 120 MS
QTC CALCULATION (BEZET), ECG08: 475 MS
VENTRICULAR RATE, ECG03: 96 BPM

## 2020-01-19 PROCEDURE — 65660000000 HC RM CCU STEPDOWN

## 2020-01-19 PROCEDURE — 74011250636 HC RX REV CODE- 250/636: Performed by: EMERGENCY MEDICINE

## 2020-01-19 PROCEDURE — 74011000250 HC RX REV CODE- 250: Performed by: EMERGENCY MEDICINE

## 2020-01-19 PROCEDURE — 74011000250 HC RX REV CODE- 250: Performed by: HOSPITALIST

## 2020-01-19 PROCEDURE — 74011250637 HC RX REV CODE- 250/637: Performed by: EMERGENCY MEDICINE

## 2020-01-19 PROCEDURE — 96375 TX/PRO/DX INJ NEW DRUG ADDON: CPT

## 2020-01-19 PROCEDURE — 74011250636 HC RX REV CODE- 250/636: Performed by: HOSPITALIST

## 2020-01-19 RX ORDER — ATORVASTATIN CALCIUM 40 MG/1
80 TABLET, FILM COATED ORAL
Status: DISCONTINUED | OUTPATIENT
Start: 2020-01-19 | End: 2020-01-21 | Stop reason: HOSPADM

## 2020-01-19 RX ORDER — HEPARIN SODIUM 5000 [USP'U]/ML
5000 INJECTION, SOLUTION INTRAVENOUS; SUBCUTANEOUS EVERY 8 HOURS
Status: DISCONTINUED | OUTPATIENT
Start: 2020-01-19 | End: 2020-01-20

## 2020-01-19 RX ORDER — SODIUM CHLORIDE 9 MG/ML
75 INJECTION, SOLUTION INTRAVENOUS CONTINUOUS
Status: DISPENSED | OUTPATIENT
Start: 2020-01-19 | End: 2020-01-20

## 2020-01-19 RX ORDER — GUAIFENESIN 100 MG/5ML
81 LIQUID (ML) ORAL DAILY
Status: DISCONTINUED | OUTPATIENT
Start: 2020-01-20 | End: 2020-01-21 | Stop reason: HOSPADM

## 2020-01-19 RX ORDER — ACETAMINOPHEN 325 MG/1
650 TABLET ORAL
Status: DISCONTINUED | OUTPATIENT
Start: 2020-01-19 | End: 2020-01-21 | Stop reason: HOSPADM

## 2020-01-19 RX ORDER — NORTRIPTYLINE HYDROCHLORIDE 25 MG/1
50 CAPSULE ORAL
COMMUNITY

## 2020-01-19 RX ADMIN — ACETAMINOPHEN 650 MG: 325 TABLET ORAL at 20:42

## 2020-01-19 RX ADMIN — HEPARIN SODIUM 5000 UNITS: 5000 INJECTION INTRAVENOUS; SUBCUTANEOUS at 16:22

## 2020-01-19 RX ADMIN — CEFTRIAXONE SODIUM 1 G: 1 INJECTION, POWDER, FOR SOLUTION INTRAMUSCULAR; INTRAVENOUS at 16:26

## 2020-01-19 RX ADMIN — WATER 1 G: 1 INJECTION INTRAMUSCULAR; INTRAVENOUS; SUBCUTANEOUS at 01:21

## 2020-01-19 RX ADMIN — SODIUM CHLORIDE 75 ML/HR: 900 INJECTION, SOLUTION INTRAVENOUS at 16:31
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
## 2020-01-19 NOTE — ED NOTES
Tele neuro Dr. Vicente Pope at bedside. Pt able to answer all questions and can move all extremities as instructed.

## 2020-01-19 NOTE — PROGRESS NOTES
Patient has been seen and evaluated in the emergency room discussed admission with the ER physician, Dr Todd Mcfarland. I have accepted the patient and placed essential orders.   Full H&P note to follow            \

## 2020-01-19 NOTE — ED TRIAGE NOTES
Patient complaints of sudden onset of confusion, left facial drooping, and left sided weakness since 1600 today. Patient states that she is unable to focus and complete a thought.

## 2020-01-19 NOTE — H&P
History & Physical    Patient: Blanca Maldonado MRN: [id number]  CSN: [id number]    YOB: 1965  Age: 62 y.o. Sex: female      DOA: 1/18/2020       HPI:     Blanca Maldonado is a 62 y.o. female with a past medical history of fibromyalgia, chronic cervical DDD, asthma, s/p gastric bypass , s/p GOMEZ and anemia. She recently moved to [facility] from [place] to live with her male partner, which he comments has been a very positive move. She has not yet established this area with a PCP. She works 60 hours a week as an  at [employer]. In the course of her usual work duties on 1/18/2020 she became confused and her left arm felt especially weak with left facial numbness she recalls difficulty focusing and was thought to express her ideas and speech. She states she felt as if the top of her scalp got very very hot, had nausea and vomiting-stating it did not feel like a headache just a lot of pressure on the top of her head. She presented to [facility] emergency room. Last well-seen was about 5 hours. Patient denied any recent trauma, she denied any recent illness or fever, she initially reported some photophobia. She reports a history of migraines and states she has never gotten confused with her migraines or had any symptoms like this. In the emergency room she was initially tachycardic /96 respirations regular with pulse ox 98%. Clinically she had left upper extremity weakness and drift. Telemetry neurology was consulted and recommended aspirin and MRI of the brain. Patient was out of the window for TPA. She was admitted to neuroscience telemetry unit.       Past Medical History:   Diagnosis Date    Anemia     Arthritis     Asthma     Cervical neck pain with evidence of disc disease     Fibromyalgia     Migraines        Past Surgical History:   Procedure Laterality Date    ABDOMEN SURGERY PROC UNLISTED      HX CHOLECYSTECTOMY      HX GASTRIC BYPASS      HX GYN      HX HYSTERECTOMY      HX ORTHOPAEDIC      L arm has steel bar        Family History   Problem Relation Age of Onset    Cancer Mother     Heart Disease Sister     Heart Disease Father        Social History     Socioeconomic History    Marital status:      Spouse name: Not on file    Number of children: Not on file    Years of education: Not on file    Highest education level: Not on file   Tobacco Use    Smoking status: Former Smoker     Packs/day: 0.50     Last attempt to quit: 2015     Years since quittin.2    Smokeless tobacco: Never Used   Substance and Sexual Activity    Alcohol use: No    Drug use: No    Sexual activity: Not Currently       Prior to Admission medications    Medication Sig Start Date End Date Taking? Authorizing Provider   ferrous sulfate 325 mg (65 mg iron) tablet Take 1 Tab by mouth two (2) times a day. 19   Orlie Old, NP   cyclobenzaprine (FLEXERIL) 5 mg tablet Take 1 Tab by mouth three (3) times daily (with meals). 19   Orlie Old, NP   Comp. Stocking,Knee,Regular,Lrg misc 20-30 mm hg 19   Orli Old, NP   albuterol (PROVENTIL HFA) 90 mcg/actuation inhaler Take 2 Puffs by inhalation. Other, MD Enriqueta   naproxen sodium (ALEVE) 220 mg cap Take  by mouth. Other, MD Enriqueta   meclizine (ANTIVERT) 25 mg tablet Take 1 Tab by mouth three (3) times daily as needed.  6/23/15   Daniel Dietrich PA       Allergies   Allergen Reactions    Imitrex [Sumatriptan Succinate] Other (comments)     \"burned my skin\"    Penicillins Hives    Prednisone Hives     Review of systems  12 point review of systems was performed and unremarkable except as stated in the HPI         Physical Exam:      Visit Vitals  /86   Pulse (!) 103   Temp 99.4 °F (37.4 °C)   Resp 22   Ht 4' 11\" (1.499 m)   Wt 75.3 kg (166 lb 1.6 oz)   SpO2 99%   BMI 33.55 kg/m²       Physical Exam:  GENERAL: fatigued, cooperative, mild distress  HEENT head is atraumatic, conjunctiva clear, anicteric, no nystagmus detected, no photophobia, facial symmetry, neck is supple  Chest heart rate tacky 103 regular, lungs are clear to bases  Abdomen soft, bowel sounds positive, nontender  Extremities LUE redemonstrates a mild pronator drift, strength 4/5,  Right upper extremity 5/5, BLE 4/5; inclusive exam patient's legs became extremely uncomfortable-she states this is common when she gets in bed at home, describes it as agonizing pain that is relieved when she gets up and walks around-RN and self assisted patient for a walk around the ER unit and symptoms subsided  Calves are soft, no edema, feet are warm positive pulses  No gross signs of muscle atrophy or hairless areas along calves    Lab/Data Review:  Labs: Results:       Chemistry Recent Labs     01/18/20 2205   GLU 91      K 3.5   *   CO2 22   BUN 14   CREA 0.95   CA 8.2*   AGAP 7   BUCR 15   *   TP 7.9   ALB 3.5   GLOB 4.4*   AGRAT 0.8      CBC w/Diff Recent Labs     01/18/20 2205   WBC 9.9   RBC 4.21   HGB 8.0*   HCT 28.4*   *   GRANS 73   LYMPH 20*   EOS 1      Coagulation Recent Labs     01/18/20 2205   PTP 12.5   INR 1.0       Iron/Ferritin No results for input(s): IRON in the last 72 hours. No lab exists for component: TIBCCALC   BNP No results for input(s): BNPP in the last 72 hours. Cardiac Enzymes Recent Labs     01/18/20 2205      CKND1 1.2      Liver Enzymes Recent Labs     01/18/20 2205   TP 7.9   ALB 3.5   *   SGOT 25      Thyroid Studies Lab Results   Component Value Date/Time    TSH 3.180 08/22/2019 12:00 AM          All Micro Results     None          Imaging Reviewed:  CT Results (most recent):  Results from [place] encounter on 01/18/20   CT HEAD WO CONT    Narrative EXAM: CT of the Head without contrast    INDICATION:  code stroke . Left-sided weakness and facial droop.     TECHNIQUE: CT of the head from the vertex to the skull base performed. No IV  contrast administered. All CT scans at this facility are performed using dose optimization technique as  appropriate to a performed exam, to include automated exposure control,  adjustment of the mA and/or kV according to patient size (including appropriate  matching for site specific examination) or use of iterative reconstruction  technique. COMPARISON: 11/22/2015 and 6/23/2015    FINDINGS: No evidence of acute intra-axial or extra-axial hemorrhage. The  ventricles and sulci are symmetric but minimally prominent. Mild subcortical  white matter hypodensities are noted. These appear to been present previously. Patient has an empty sella. No midline shift, mass effect or mass lesion  appreciated. The gray-white junction is preserved. No evidence of an acute  infarct identified. The mastoid air cells are well aerated. Mucosal retention  cysts in the right maxillary sinus is noted. The orbits are normal. The scalp  and skull are unremarkable. Impression IMPRESSION:  1. No acute intracranial process identified. If continued clinical concern for  acute ischemia, consider MR for further evaluation. 2.  Mild multifocal chronic small vessel ischemic changes. Findings discussed with NP Yair Heredia at 2208 hours on 1/18/2020. *ATTENTION: The purpose of head CT in the setting of Code Stroke is to evaluate  for contraindications to TPA or other interventions such as large territory  ischemia, hemorrhage, or mass. The lack of evidence of stroke should not exclude  stroke and should not preclude further investigation. MRI is indicated if  clinical concern for acute ischemia. *       Assessment:   Principal Problem:    Weakness of left upper extremity (1/19/2020)    Active Problems:    UTI (urinary tract infection) (1/19/2020)      Nausea and vomiting (1/19/2020)      TIA (transient ischemic attack) (1/19/2020)      S/P gastric bypass (8/4/2012)      Nocturnal leg cramps (1/19/2020)      Plan:   Admit to neuroscience telemetry  TIA protocol  Aspirin and statin  Consult Dr. Denise Gar neurology  PT OT ST  Echocardiogram  Check bilateral lower extremity venous Dopplers  Continue Rocephin for UTI     Full code    Elaine Murphy,   1/19/2020, 2:59 AM

## 2020-01-19 NOTE — ED PROVIDER NOTES
Juve Landrum is a 62 y.o. female with a history of migraines and fibromyalgia coming in complaining of head pressure, nausea, confusion, and weakness. Patient states that she was fine when she woke up this morning. She is able to put exact time on it, but states that somewhere around this afternoon she started having a lot of pressure in her head. She states that she has had a lot of nausea and vomiting. She also reports confusion. She states that people told her she was \"zoning out. \"  She denies any numbness or weakness. Denies any falls or trauma. Denies any neck pain. Denies any fevers or chills. Patient states normally she does not get confused with her migraines. She does say that the lights are bothering her eyes. She has no other complaints at this time.            Past Medical History:   Diagnosis Date    Anemia     Arthritis     Asthma     Cervical neck pain with evidence of disc disease     Fibromyalgia     Migraines        Past Surgical History:   Procedure Laterality Date    ABDOMEN SURGERY PROC UNLISTED      HX CHOLECYSTECTOMY      HX GASTRIC BYPASS      HX GYN      HX HYSTERECTOMY      HX ORTHOPAEDIC      L arm has steel bar          Family History:   Problem Relation Age of Onset    Cancer Mother     Heart Disease Sister     Heart Disease Father        Social History     Socioeconomic History    Marital status:      Spouse name: Not on file    Number of children: Not on file    Years of education: Not on file    Highest education level: Not on file   Occupational History    Not on file   Social Needs    Financial resource strain: Not on file    Food insecurity:     Worry: Not on file     Inability: Not on file    Transportation needs:     Medical: Not on file     Non-medical: Not on file   Tobacco Use    Smoking status: Former Smoker     Packs/day: 0.50     Last attempt to quit: 2015     Years since quittin.2    Smokeless tobacco: Never Used Substance and Sexual Activity    Alcohol use: No    Drug use: No    Sexual activity: Not Currently   Lifestyle    Physical activity:     Days per week: Not on file     Minutes per session: Not on file    Stress: Not on file   Relationships    Social connections:     Talks on phone: Not on file     Gets together: Not on file     Attends Yarsani service: Not on file     Active member of club or organization: Not on file     Attends meetings of clubs or organizations: Not on file     Relationship status: Not on file    Intimate partner violence:     Fear of current or ex partner: Not on file     Emotionally abused: Not on file     Physically abused: Not on file     Forced sexual activity: Not on file   Other Topics Concern    Not on file   Social History Narrative    Not on file         ALLERGIES: Imitrex [sumatriptan succinate]; Penicillins; and Prednisone    Review of Systems   Constitutional: Negative. Negative for chills and fever. Eyes: Positive for photophobia. Respiratory: Negative for shortness of breath. Cardiovascular: Negative. Negative for chest pain. Gastrointestinal: Positive for nausea and vomiting. Negative for abdominal pain. Genitourinary: Negative. Negative for dysuria. Musculoskeletal: Negative. Negative for myalgias. Skin: Negative. Negative for rash. Neurological: Positive for dizziness and headaches. Negative for syncope, weakness, light-headedness and numbness. Psychiatric/Behavioral: Positive for confusion. All other systems reviewed and are negative. Vitals:    01/18/20 2230 01/18/20 2245 01/18/20 2315 01/18/20 2330   BP: (!) 155/96 148/78 135/87 150/75   Pulse: 97 91 95 91   Resp: 20 19 23 18   Temp:       SpO2: 98% 99% 98% 98%   Weight:       Height:                Physical Exam  Vitals signs reviewed. Constitutional:       General: She is not in acute distress. Appearance: Normal appearance. She is well-developed.    HENT:      Head: Normocephalic and atraumatic. Mouth/Throat:      Mouth: Mucous membranes are dry. Eyes:      Extraocular Movements: Extraocular movements intact. Conjunctiva/sclera: Conjunctivae normal.      Pupils: Pupils are equal, round, and reactive to light. Neck:      Musculoskeletal: Normal range of motion and neck supple. Cardiovascular:      Rate and Rhythm: Regular rhythm. Tachycardia present. Heart sounds: S1 normal and S2 normal. No murmur. No friction rub. No gallop. Pulmonary:      Effort: Pulmonary effort is normal. No accessory muscle usage or respiratory distress. Breath sounds: Normal breath sounds. Abdominal:      General: There is no distension. Tenderness: There is no tenderness. Musculoskeletal: Normal range of motion. General: No tenderness. Skin:     General: Skin is warm. Findings: No rash. Neurological:      General: No focal deficit present. Mental Status: She is alert and oriented to person, place, and time. Sensory: No sensory deficit. Comments: Patient is a slight left upper extremity drift and weakness. Patient speech is slowed although she does not have typical dysarthria. Psychiatric:         Speech: Speech normal.          MDM  Number of Diagnoses or Management Options  Diagnosis management comments: Doc Haile is a 62 y.o. female coming in with nausea, head pressure, and left upper extremity weakness. Also reports confusion and speech difficulties. Differential diagnosis includes CVA, complicated migraine fictitious disorder, secondary metabolic process, brain tumor, among others. Code S was called immediately on arrival by provider in triage. Will initiate stroke work-up and consult tele-neurology. Patient was seen by tele-neurology. They felt that this was most likely a complicated migraine, however with new focal deficits they recommended MRI brain and aspirin.   Patient is unable to give an exact time of the onset of symptoms. Sounds like it sometime around 4 PM, patient is out of the window for TPA. Procedures      . Vitals:  Patient Vitals for the past 12 hrs:   Temp Pulse Resp BP SpO2   01/18/20 2330  91 18 150/75 98 %   01/18/20 2315  95 23 135/87 98 %   01/18/20 2245  91 19 148/78 99 %   01/18/20 2230  97 20 (!) 155/96 98 %   01/18/20 2215  97 17 155/80 98 %   01/18/20 2208  (!) 101 14  100 %   01/18/20 2206    160/80 100 %   01/18/20 2143 99.4 °F (37.4 °C) (!) 102 18 143/86 100 %       Medications ordered:   Medications   cefTRIAXone (ROCEPHIN) 1 g in sterile water (preservative free) 10 mL IV syringe (has no administration in time range)   aspirin chewable tablet 324 mg (324 mg Oral Given 1/18/20 2227)   prochlorperazine (COMPAZINE) injection 10 mg (10 mg IntraVENous Given 1/18/20 2308)   diphenhydrAMINE (BENADRYL) injection 25 mg (25 mg IntraVENous Given 1/18/20 2309)         Lab findings:  Recent Results (from the past 12 hour(s))   CBC WITH AUTOMATED DIFF    Collection Time: 01/18/20 10:05 PM   Result Value Ref Range    WBC 9.9 4.6 - 13.2 K/uL    RBC 4.21 4.20 - 5.30 M/uL    HGB 8.0 (L) 12.0 - 16.0 g/dL    HCT 28.4 (L) 35.0 - 45.0 %    MCV 67.5 (L) 74.0 - 97.0 FL    MCH 19.0 (L) 24.0 - 34.0 PG    MCHC 28.2 (L) 31.0 - 37.0 g/dL    RDW 17.4 (H) 11.6 - 14.5 %    PLATELET 306 (H) 726 - 420 K/uL    MPV 9.5 9.2 - 11.8 FL    NEUTROPHILS 73 40 - 73 %    LYMPHOCYTES 20 (L) 21 - 52 %    MONOCYTES 6 3 - 10 %    EOSINOPHILS 1 0 - 5 %    BASOPHILS 0 0 - 2 %    ABS. NEUTROPHILS 7.1 1.8 - 8.0 K/UL    ABS. LYMPHOCYTES 2.0 0.9 - 3.6 K/UL    ABS. MONOCYTES 0.6 0.05 - 1.2 K/UL    ABS. EOSINOPHILS 0.1 0.0 - 0.4 K/UL    ABS.  BASOPHILS 0.0 0.0 - 0.1 K/UL    DF AUTOMATED     CARDIAC PANEL,(CK, CKMB & TROPONIN)    Collection Time: 01/18/20 10:05 PM   Result Value Ref Range     26 - 192 U/L    CK - MB 1.4 <3.6 ng/ml    CK-MB Index 1.2 0.0 - 4.0 %    Troponin-I, QT <0.02 0.0 - 4.298 NG/ML   METABOLIC PANEL, COMPREHENSIVE    Collection Time: 01/18/20 10:05 PM   Result Value Ref Range    Sodium 141 136 - 145 mmol/L    Potassium 3.5 3.5 - 5.5 mmol/L    Chloride 112 (H) 100 - 111 mmol/L    CO2 22 21 - 32 mmol/L    Anion gap 7 3.0 - 18 mmol/L    Glucose 91 74 - 99 mg/dL    BUN 14 7.0 - 18 MG/DL    Creatinine 0.95 0.6 - 1.3 MG/DL    BUN/Creatinine ratio 15 12 - 20      GFR est AA >60 >60 ml/min/1.73m2    GFR est non-AA >60 >60 ml/min/1.73m2    Calcium 8.2 (L) 8.5 - 10.1 MG/DL    Bilirubin, total 0.2 0.2 - 1.0 MG/DL    ALT (SGPT) 27 13 - 56 U/L    AST (SGOT) 25 10 - 38 U/L    Alk.  phosphatase 118 (H) 45 - 117 U/L    Protein, total 7.9 6.4 - 8.2 g/dL    Albumin 3.5 3.4 - 5.0 g/dL    Globulin 4.4 (H) 2.0 - 4.0 g/dL    A-G Ratio 0.8 0.8 - 1.7     PROTHROMBIN TIME + INR    Collection Time: 01/18/20 10:05 PM   Result Value Ref Range    Prothrombin time 12.5 11.5 - 15.2 sec    INR 1.0 0.8 - 1.2     EKG, 12 LEAD, INITIAL    Collection Time: 01/18/20 10:23 PM   Result Value Ref Range    Ventricular Rate 96 BPM    Atrial Rate 96 BPM    P-R Interval 154 ms    QRS Duration 120 ms    Q-T Interval 376 ms    QTC Calculation (Bezet) 475 ms    Calculated P Axis 57 degrees    Calculated R Axis 26 degrees    Calculated T Axis 17 degrees    Diagnosis       Normal sinus rhythm  Right bundle branch block  Abnormal ECG  When compared with ECG of 29-OCT-2019 14:57,  Right bundle branch block is now present     DRUG SCREEN, URINE    Collection Time: 01/18/20 11:07 PM   Result Value Ref Range    BENZODIAZEPINES NEGATIVE  NEG      BARBITURATES NEGATIVE  NEG      THC (TH-CANNABINOL) NEGATIVE  NEG      OPIATES NEGATIVE  NEG      PCP(PHENCYCLIDINE) NEGATIVE  NEG      COCAINE NEGATIVE  NEG      AMPHETAMINES NEGATIVE  NEG      METHADONE NEGATIVE  NEG      HDSCOM (NOTE)    URINALYSIS W/ RFLX MICROSCOPIC    Collection Time: 01/18/20 11:07 PM   Result Value Ref Range    Color YELLOW      Appearance CLEAR      Specific gravity 1.015 1.005 - 1.030      pH (UA) 5.5 5.0 - 8.0      Protein NEGATIVE  NEG mg/dL    Glucose NEGATIVE  NEG mg/dL    Ketone NEGATIVE  NEG mg/dL    Bilirubin NEGATIVE  NEG      Blood TRACE (A) NEG      Urobilinogen 1.0 0.2 - 1.0 EU/dL    Nitrites POSITIVE (A) NEG      Leukocyte Esterase MODERATE (A) NEG     URINE MICROSCOPIC ONLY    Collection Time: 01/18/20 11:07 PM   Result Value Ref Range    WBC 11 to 20 0 - 5 /hpf    RBC 0 to 1 0 - 5 /hpf    Epithelial cells 1+ 0 - 5 /lpf    Bacteria 3+ (A) NEG /hpf       EKG interpretation by ED Physician: Normal sinus rhythm, rate of 96 bpm.  Right bundle branch block. No acute ischemic changes. X-Ray, CT or other radiology findings or impressions:  XR CHEST PORT   Final Result   IMPRESSION:      No acute finding. CT HEAD WO CONT   Final Result   IMPRESSION:   1. No acute intracranial process identified. If continued clinical concern for   acute ischemia, consider MR for further evaluation. 2.  Mild multifocal chronic small vessel ischemic changes. Findings discussed with WILLEM Dee at 2208 hours on 1/18/2020. *ATTENTION: The purpose of head CT in the setting of Code Stroke is to evaluate   for contraindications to TPA or other interventions such as large territory   ischemia, hemorrhage, or mass. The lack of evidence of stroke should not exclude   stroke and should not preclude further investigation. MRI is indicated if   clinical concern for acute ischemia. *      MRI BRAIN WO CONT    (Results Pending)       Progress notes, Consult notes or additional Procedure notes:     Consult: Spoke to Dr. Glennette Mortimer, neurology, regarding patient's symptoms, exam findings, and concern for CVA. He evaluated the patient and did agree that she had a mild left arm drift. He felt that this could be a complicated migraine versus an acute CVA.   He recommended aspirin, but did not feel the patient was a good candidate for TPA as she has a low NIH stroke scale and she is unsure exactly when the symptoms started. He recommend admission for MRI brain. Consult: Spoke to Dr. Xander Arora, hospitalist, regarding patient's symptoms, neurology recommendations, exam, and results. She agrees to admit to the neuro floor for further work-up and evaluation. Reevaluation of patient:   I have reassessed the patient. Patient is feeling a little bit better. She is resting comfortably in bed in no distress. Vitals are reassuring. Discussed all her results as well as neurology recommendations and need for admission and she verbalizes understanding and agrees with this plan. Critical Care Time:  The services I provided to this patient were to treat and/or prevent clinically significant deterioration that could result in the failure of one or more body systems and/or organ systems due to acute ischemic stroke. Services included the following:  -reviewing nursing notes and old charts  -vital sign assessments  -direct patient care  -medication orders and management  -interpreting and reviewing diagnostic studies/labs  -re-evaluations  -documentation time    Aggregate critical care time was 40 minutes, which includes only time during which I was engaged in work directly related to the patient's care as described above, whether I was at bedside or elsewhere in the Emergency Department. It did not include time spent performing other reported procedures or the services of residents, students, nurses, or advance practice providers. Hakan Viera MD    12:44 AM      Disposition:  Diagnosis:   1. Weakness of left upper extremity    2. Nonintractable headache, unspecified chronicity pattern, unspecified headache type        Disposition: Admit to stroke/neuro floor    Follow-up Information    None          Patient's Medications   Start Taking    No medications on file   Continue Taking    ALBUTEROL (PROVENTIL HFA) 90 MCG/ACTUATION INHALER    Take 2 Puffs by inhalation. COMP. [facility] MISC    20-30 mm hg    CYCLOBENZAPRINE (FLEXERIL) 5 MG TABLET    Take 1 Tab by mouth three (3) times daily (with meals). FERROUS SULFATE 325 MG (65 MG IRON) TABLET    Take 1 Tab by mouth two (2) times a day. MECLIZINE (ANTIVERT) 25 MG TABLET    Take 1 Tab by mouth three (3) times daily as needed. NAPROXEN SODIUM (ALEVE) 220 MG CAP    Take  by mouth.    These Medications have changed    No medications on file   Stop Taking    No medications on file

## 2020-01-20 ENCOUNTER — APPOINTMENT (OUTPATIENT)
Dept: MRI IMAGING | Age: 55
DRG: 812 | End: 2020-01-20
Attending: HOSPITALIST
Payer: COMMERCIAL

## 2020-01-20 ENCOUNTER — APPOINTMENT (OUTPATIENT)
Dept: VASCULAR SURGERY | Age: 55
DRG: 812 | End: 2020-01-20
Attending: HOSPITALIST
Payer: COMMERCIAL

## 2020-01-20 ENCOUNTER — APPOINTMENT (OUTPATIENT)
Dept: NON INVASIVE DIAGNOSTICS | Age: 55
DRG: 812 | End: 2020-01-20
Attending: HOSPITALIST
Payer: COMMERCIAL

## 2020-01-20 LAB
CHOLEST SERPL-MCNC: 117 MG/DL
CK MB CFR SERPL CALC: NORMAL % (ref 0–4)
CK MB SERPL-MCNC: <1 NG/ML (ref 5–25)
CK SERPL-CCNC: 58 U/L (ref 26–192)
ECHO AO ROOT DIAM: 2.87 CM
ECHO LA AREA 4C: 22.7 CM2
ECHO LA VOL 2C: 49.04 ML (ref 22–52)
ECHO LA VOL 4C: 69.41 ML (ref 22–52)
ECHO LA VOL BP: 65.36 ML (ref 22–52)
ECHO LA VOL/BSA BIPLANE: 38.37 ML/M2 (ref 16–28)
ECHO LA VOLUME INDEX A2C: 28.79 ML/M2 (ref 16–28)
ECHO LA VOLUME INDEX A4C: 40.74 ML/M2 (ref 16–28)
ECHO LV E' LATERAL VELOCITY: 10.38 CM/S
ECHO LV E' SEPTAL VELOCITY: 10.75 CM/S
ECHO LV EDV TEICHHOLZ: 0.87 ML
ECHO LV ESV TEICHHOLZ: 0.29 ML
ECHO LV INTERNAL DIMENSION DIASTOLIC: 5.21 CM (ref 3.9–5.3)
ECHO LV INTERNAL DIMENSION SYSTOLIC: 3.27 CM
ECHO LV IVSD: 0.81 CM (ref 0.6–0.9)
ECHO LV MASS 2D: 172.6 G (ref 67–162)
ECHO LV MASS INDEX 2D: 101.3 G/M2 (ref 43–95)
ECHO LV POSTERIOR WALL DIASTOLIC: 0.83 CM (ref 0.6–0.9)
ECHO LVOT DIAM: 2.02 CM
ECHO LVOT PEAK GRADIENT: 4.3 MMHG
ECHO LVOT PEAK VELOCITY: 103.52 CM/S
ECHO LVOT VTI: 20.58 CM
ECHO MV A VELOCITY: 77.52 CM/S
ECHO MV E DECELERATION TIME (DT): 198.8 MS
ECHO MV E VELOCITY: 81.58 CM/S
ECHO MV E/A RATIO: 1.05
ECHO MV E/E' LATERAL: 7.86
ECHO MV E/E' RATIO (AVERAGED): 7.72
ECHO MV E/E' SEPTAL: 7.59
ECHO RV TAPSE: 2.72 CM (ref 1.5–2)
ECHO TV REGURGITANT MAX VELOCITY: 221.11 CM/S
ECHO TV REGURGITANT PEAK GRADIENT: 19.6 MMHG
ERYTHROCYTE [DISTWIDTH] IN BLOOD BY AUTOMATED COUNT: 17.2 % (ref 11.6–14.5)
EST. AVERAGE GLUCOSE BLD GHB EST-MCNC: ABNORMAL MG/DL
FOLATE SERPL-MCNC: 11.2 NG/ML (ref 3.1–17.5)
FOLATE SERPL-MCNC: 15 NG/ML (ref 3.1–17.5)
GLUCOSE BLD STRIP.AUTO-MCNC: 104 MG/DL (ref 70–110)
HBA1C MFR BLD: <3.5 % (ref 4.2–5.6)
HCT VFR BLD AUTO: 24.1 % (ref 35–45)
HDLC SERPL-MCNC: 31 MG/DL (ref 40–60)
HDLC SERPL: 3.8 {RATIO} (ref 0–5)
HGB BLD-MCNC: 7 G/DL (ref 12–16)
IRON SATN MFR SERPL: 3 %
IRON SATN MFR SERPL: 3 %
IRON SERPL-MCNC: 14 UG/DL (ref 50–175)
IRON SERPL-MCNC: 14 UG/DL (ref 50–175)
LDLC SERPL CALC-MCNC: 70.4 MG/DL (ref 0–100)
LIPID PROFILE,FLP: ABNORMAL
LVFS 2D: 37.32 %
LVOT MG: 1.98 MMHG
LVOT MV: 0.65 CM/S
LVSV (TEICH): 49.15 ML
MCH RBC QN AUTO: 19.4 PG (ref 24–34)
MCHC RBC AUTO-ENTMCNC: 29 G/DL (ref 31–37)
MCV RBC AUTO: 66.9 FL (ref 74–97)
MV DEC SLOPE: 4.1
PLATELET # BLD AUTO: 329 K/UL (ref 135–420)
PMV BLD AUTO: 9.7 FL (ref 9.2–11.8)
RBC # BLD AUTO: 3.6 M/UL (ref 4.2–5.3)
TIBC SERPL-MCNC: 427 UG/DL (ref 250–450)
TIBC SERPL-MCNC: 432 UG/DL (ref 250–450)
TRIGL SERPL-MCNC: 78 MG/DL (ref ?–150)
TROPONIN I SERPL-MCNC: <0.02 NG/ML (ref 0–0.04)
VIT B12 SERPL-MCNC: 312 PG/ML (ref 211–911)
VIT B12 SERPL-MCNC: 324 PG/ML (ref 211–911)
VLDLC SERPL CALC-MCNC: 15.6 MG/DL
WBC # BLD AUTO: 6.5 K/UL (ref 4.6–13.2)

## 2020-01-20 PROCEDURE — A9575 INJ GADOTERATE MEGLUMI 0.1ML: HCPCS | Performed by: HOSPITALIST

## 2020-01-20 PROCEDURE — 74011250637 HC RX REV CODE- 250/637: Performed by: EMERGENCY MEDICINE

## 2020-01-20 PROCEDURE — 83540 ASSAY OF IRON: CPT

## 2020-01-20 PROCEDURE — 83036 HEMOGLOBIN GLYCOSYLATED A1C: CPT

## 2020-01-20 PROCEDURE — 97162 PT EVAL MOD COMPLEX 30 MIN: CPT

## 2020-01-20 PROCEDURE — 82962 GLUCOSE BLOOD TEST: CPT

## 2020-01-20 PROCEDURE — 74011250636 HC RX REV CODE- 250/636: Performed by: HOSPITALIST

## 2020-01-20 PROCEDURE — 97116 GAIT TRAINING THERAPY: CPT

## 2020-01-20 PROCEDURE — 74011250637 HC RX REV CODE- 250/637: Performed by: HOSPITALIST

## 2020-01-20 PROCEDURE — 74011000250 HC RX REV CODE- 250: Performed by: HOSPITALIST

## 2020-01-20 PROCEDURE — 93306 TTE W/DOPPLER COMPLETE: CPT

## 2020-01-20 PROCEDURE — 70553 MRI BRAIN STEM W/O & W/DYE: CPT

## 2020-01-20 PROCEDURE — 85027 COMPLETE CBC AUTOMATED: CPT

## 2020-01-20 PROCEDURE — 65660000000 HC RM CCU STEPDOWN

## 2020-01-20 PROCEDURE — 80061 LIPID PANEL: CPT

## 2020-01-20 PROCEDURE — 74011000258 HC RX REV CODE- 258: Performed by: HOSPITALIST

## 2020-01-20 PROCEDURE — 82607 VITAMIN B-12: CPT

## 2020-01-20 PROCEDURE — 82550 ASSAY OF CK (CPK): CPT

## 2020-01-20 PROCEDURE — 93970 EXTREMITY STUDY: CPT

## 2020-01-20 PROCEDURE — 97535 SELF CARE MNGMENT TRAINING: CPT

## 2020-01-20 PROCEDURE — 97165 OT EVAL LOW COMPLEX 30 MIN: CPT

## 2020-01-20 RX ORDER — SODIUM CHLORIDE 9 MG/ML
10 INJECTION INTRAMUSCULAR; INTRAVENOUS; SUBCUTANEOUS
Status: COMPLETED | OUTPATIENT
Start: 2020-01-20 | End: 2020-01-20

## 2020-01-20 RX ORDER — GADOTERATE MEGLUMINE 376.9 MG/ML
15 INJECTION INTRAVENOUS
Status: COMPLETED | OUTPATIENT
Start: 2020-01-20 | End: 2020-01-20

## 2020-01-20 RX ORDER — LORAZEPAM 1 MG/1
1 TABLET ORAL
Status: COMPLETED | OUTPATIENT
Start: 2020-01-20 | End: 2020-01-20

## 2020-01-20 RX ADMIN — ACETAMINOPHEN 650 MG: 325 TABLET ORAL at 16:52

## 2020-01-20 RX ADMIN — SODIUM CHLORIDE 10 ML: 9 INJECTION INTRAMUSCULAR; INTRAVENOUS; SUBCUTANEOUS at 08:54

## 2020-01-20 RX ADMIN — ATORVASTATIN CALCIUM 80 MG: 40 TABLET, FILM COATED ORAL at 21:48

## 2020-01-20 RX ADMIN — LORAZEPAM 1 MG: 1 TABLET ORAL at 09:10

## 2020-01-20 RX ADMIN — HEPARIN SODIUM 5000 UNITS: 5000 INJECTION INTRAVENOUS; SUBCUTANEOUS at 06:27

## 2020-01-20 RX ADMIN — CEFTRIAXONE SODIUM 1 G: 1 INJECTION, POWDER, FOR SOLUTION INTRAMUSCULAR; INTRAVENOUS at 15:21

## 2020-01-20 RX ADMIN — IRON SUCROSE 200 MG: 20 INJECTION, SOLUTION INTRAVENOUS at 20:15

## 2020-01-20 RX ADMIN — GADOTERATE MEGLUMINE 15 ML: 376.9 INJECTION INTRAVENOUS at 10:00

## 2020-01-20 RX ADMIN — HEPARIN SODIUM 5000 UNITS: 5000 INJECTION INTRAVENOUS; SUBCUTANEOUS at 15:22

## 2020-01-20 NOTE — PROGRESS NOTES
Speech Therapy:     Pt currently off unit. Will complete dysphagia eval later this date or as medical condition permits.      Thank you for this referral.    Jessy Bowens M.S. CCC-SLP/L  Speech-Language Pathologist

## 2020-01-20 NOTE — ED NOTES
Assumed care of pt, pt resting on stretcher with family at bedside, pt awaiting admission, pt currently has no complaints, receiving fluids at this time, pt alert and oriented x 4, ambulatory, pt has equal strength, sensation, no facial droop.

## 2020-01-20 NOTE — PROGRESS NOTES
[facility] [place] [facility]  Progress Note    Patient: Paul Tee Age: 62 y.o. : 1965 MR#: [id number] SSN: [id number]  Date/Time: 2020     Subjective:     Pt seen & evaluated, lying in bed, NAD         Assessment/Plan:     1. Blurred vision - concern for TIA - negative for Acute CVA per MRI, Neurology on board , Echo reviewed - WNL   2. Anemia - pt mentions that she has chronic anemia 2y to Gastric bypass surg - will give IV venofer x1 now   3. Iron deficiency - will give PO iron on discharge   4. UTI - IV Rocephin , await cx & sensitivity   DVT px - SCD   FC              Case discussed with:  [x]Patient  []Family  []Nursing  []Case Management  DVT Prophylaxis:  []Lovenox  []Hep SQ  [x]SCDs  []Coumadin   []On Heparin gtt    Objective:   VS:   Visit Vitals  /76   Pulse 80   Temp 97.2 °F (36.2 °C)   Resp 18   Ht 4' 11\" (1.499 m)   Wt 75.3 kg (166 lb)   SpO2 100%   Breastfeeding No   BMI 33.53 kg/m²      Tmax/24hrs: Temp (24hrs), Av.8 °F (36.6 °C), Min:97.2 °F (36.2 °C), Max:98.2 °F (36.8 °C)  IOBRIEF    Intake/Output Summary (Last 24 hours) at 2020 1614  Last data filed at 2020 1527  Gross per 24 hour   Intake 700 ml   Output    Net 700 ml       General:  Alert, cooperative, no acute distress    HEENT: PERRLA, anicteric sclerae. Pulmonary:  CTA Bilaterally. No Wheezing/Rhonchi/Rales. Cardiovascular: Regular rate and Rhythm. GI:  Soft, Non distended, Non tender. + Bowel sounds. Extremities:  No edema, cyanosis, clubbing. No calf tenderness. Neurologic: Alert and oriented X 3. No acute neuro deficits.   Additional:    Medications:   Current Facility-Administered Medications   Medication Dose Route Frequency    iron sucrose (VENOFER) 200 mg in 0.9% sodium chloride 100 mL IVPB  200 mg IntraVENous ONCE    atorvastatin (LIPITOR) tablet 80 mg  80 mg Oral QHS    aspirin chewable tablet 81 mg  81 mg Oral DAILY    heparin (porcine) injection 5,000 Units 5,000 Units SubCUTAneous Q8H    cefTRIAXone (ROCEPHIN) 1 g in sterile water (preservative free) 10 mL IV syringe  1 g IntraVENous Q24H    influenza vaccine 2019-20 (6 mos+)(PF) (FLUARIX/FLULAVAL/FLUZONE QUAD) injection 0.5 mL  0.5 mL IntraMUSCular PRIOR TO DISCHARGE    acetaminophen (TYLENOL) tablet 650 mg  650 mg Oral Q4H PRN     Current Outpatient Medications   Medication Sig    nortriptyline (PAMELOR) 25 mg capsule Take 50 mg by mouth nightly.  Comp. Stocking,Knee,Regular,Lrg misc 20-30 mm hg    albuterol (PROVENTIL HFA) 90 mcg/actuation inhaler Take 2 Puffs by inhalation. Labs:    Recent Results (from the past 48 hour(s))   CBC WITH AUTOMATED DIFF    Collection Time: 01/18/20 10:05 PM   Result Value Ref Range    WBC 9.9 4.6 - 13.2 K/uL    RBC 4.21 4.20 - 5.30 M/uL    HGB 8.0 (L) 12.0 - 16.0 g/dL    HCT 28.4 (L) 35.0 - 45.0 %    MCV 67.5 (L) 74.0 - 97.0 FL    MCH 19.0 (L) 24.0 - 34.0 PG    MCHC 28.2 (L) 31.0 - 37.0 g/dL    RDW 17.4 (H) 11.6 - 14.5 %    PLATELET 399 (H) 630 - 420 K/uL    MPV 9.5 9.2 - 11.8 FL    NEUTROPHILS 73 40 - 73 %    LYMPHOCYTES 20 (L) 21 - 52 %    MONOCYTES 6 3 - 10 %    EOSINOPHILS 1 0 - 5 %    BASOPHILS 0 0 - 2 %    ABS. NEUTROPHILS 7.1 1.8 - 8.0 K/UL    ABS. LYMPHOCYTES 2.0 0.9 - 3.6 K/UL    ABS. MONOCYTES 0.6 0.05 - 1.2 K/UL    ABS. EOSINOPHILS 0.1 0.0 - 0.4 K/UL    ABS.  BASOPHILS 0.0 0.0 - 0.1 K/UL    DF AUTOMATED     CARDIAC PANEL,(CK, CKMB & TROPONIN)    Collection Time: 01/18/20 10:05 PM   Result Value Ref Range     26 - 192 U/L    CK - MB 1.4 <3.6 ng/ml    CK-MB Index 1.2 0.0 - 4.0 %    Troponin-I, QT <0.02 0.0 - 3.453 NG/ML   METABOLIC PANEL, COMPREHENSIVE    Collection Time: 01/18/20 10:05 PM   Result Value Ref Range    Sodium 141 136 - 145 mmol/L    Potassium 3.5 3.5 - 5.5 mmol/L    Chloride 112 (H) 100 - 111 mmol/L    CO2 22 21 - 32 mmol/L    Anion gap 7 3.0 - 18 mmol/L    Glucose 91 74 - 99 mg/dL    BUN 14 7.0 - 18 MG/DL    Creatinine 0.95 0.6 - 1.3 MG/DL BUN/Creatinine ratio 15 12 - 20      GFR est AA >60 >60 ml/min/1.73m2    GFR est non-AA >60 >60 ml/min/1.73m2    Calcium 8.2 (L) 8.5 - 10.1 MG/DL    Bilirubin, total 0.2 0.2 - 1.0 MG/DL    ALT (SGPT) 27 13 - 56 U/L    AST (SGOT) 25 10 - 38 U/L    Alk.  phosphatase 118 (H) 45 - 117 U/L    Protein, total 7.9 6.4 - 8.2 g/dL    Albumin 3.5 3.4 - 5.0 g/dL    Globulin 4.4 (H) 2.0 - 4.0 g/dL    A-G Ratio 0.8 0.8 - 1.7     PROTHROMBIN TIME + INR    Collection Time: 01/18/20 10:05 PM   Result Value Ref Range    Prothrombin time 12.5 11.5 - 15.2 sec    INR 1.0 0.8 - 1.2     EKG, 12 LEAD, INITIAL    Collection Time: 01/18/20 10:23 PM   Result Value Ref Range    Ventricular Rate 96 BPM    Atrial Rate 96 BPM    P-R Interval 154 ms    QRS Duration 120 ms    Q-T Interval 376 ms    QTC Calculation (Bezet) 475 ms    Calculated P Axis 57 degrees    Calculated R Axis 26 degrees    Calculated T Axis 17 degrees    Diagnosis       Normal sinus rhythm  Right bundle branch block  Abnormal ECG  When compared with ECG of 29-OCT-2019 14:57,  Right bundle branch block is now present  Confirmed by [facility] ([id number]) on 1/19/2020 5:59:47 PM     DRUG SCREEN, URINE    Collection Time: 01/18/20 11:07 PM   Result Value Ref Range    BENZODIAZEPINES NEGATIVE  NEG      BARBITURATES NEGATIVE  NEG      THC (TH-CANNABINOL) NEGATIVE  NEG      OPIATES NEGATIVE  NEG      PCP(PHENCYCLIDINE) NEGATIVE  NEG      COCAINE NEGATIVE  NEG      AMPHETAMINES NEGATIVE  NEG      METHADONE NEGATIVE  NEG      HDSCOM (NOTE)    URINALYSIS W/ RFLX MICROSCOPIC    Collection Time: 01/18/20 11:07 PM   Result Value Ref Range    Color YELLOW      Appearance CLEAR      Specific gravity 1.015 1.005 - 1.030      pH (UA) 5.5 5.0 - 8.0      Protein NEGATIVE  NEG mg/dL    Glucose NEGATIVE  NEG mg/dL    Ketone NEGATIVE  NEG mg/dL    Bilirubin NEGATIVE  NEG      Blood TRACE (A) NEG      Urobilinogen 1.0 0.2 - 1.0 EU/dL    Nitrites POSITIVE (A) NEG      Leukocyte Esterase MODERATE (A) NEG     URINE MICROSCOPIC ONLY    Collection Time: 01/18/20 11:07 PM   Result Value Ref Range    WBC 11 to 20 0 - 5 /hpf    RBC 0 to 1 0 - 5 /hpf    Epithelial cells 1+ 0 - 5 /lpf    Bacteria 3+ (A) NEG /hpf   LIPID PANEL    Collection Time: 01/20/20  4:50 AM   Result Value Ref Range    LIPID PROFILE          Cholesterol, total 117 <200 MG/DL    Triglyceride 78 <150 MG/DL    HDL Cholesterol 31 (L) 40 - 60 MG/DL    LDL, calculated 70.4 0 - 100 MG/DL    VLDL, calculated 15.6 MG/DL    CHOL/HDL Ratio 3.8 0 - 5.0     HEMOGLOBIN A1C WITH EAG    Collection Time: 01/20/20  4:50 AM   Result Value Ref Range    Hemoglobin A1c <3.5 (L) 4.2 - 5.6 %    Est. average glucose Cannot be calculated mg/dL   CBC W/O DIFF    Collection Time: 01/20/20  4:50 AM   Result Value Ref Range    WBC 6.5 4.6 - 13.2 K/uL    RBC 3.60 (L) 4.20 - 5.30 M/uL    HGB 7.0 (L) 12.0 - 16.0 g/dL    HCT 24.1 (L) 35.0 - 45.0 %    MCV 66.9 (L) 74.0 - 97.0 FL    MCH 19.4 (L) 24.0 - 34.0 PG    MCHC 29.0 (L) 31.0 - 37.0 g/dL    RDW 17.2 (H) 11.6 - 14.5 %    PLATELET 029 086 - 868 K/uL    MPV 9.7 9.2 - 11.8 FL   IRON PROFILE    Collection Time: 01/20/20  4:50 AM   Result Value Ref Range    Iron 14 (L) 50 - 175 ug/dL    TIBC 432 250 - 450 ug/dL    Iron % saturation 3 %   VITAMIN B12 & FOLATE    Collection Time: 01/20/20  4:50 AM   Result Value Ref Range    Vitamin B12 324 211 - 911 pg/mL    Folate 11.2 3.10 - 17.50 ng/mL   CARDIAC PANEL,(CK, CKMB & TROPONIN)    Collection Time: 01/20/20  4:50 AM   Result Value Ref Range    CK 58 26 - 192 U/L    CK - MB <1.0 <3.6 ng/ml    CK-MB Index  0.0 - 4.0 %     CALCULATION NOT PERFORMED WHEN RESULT IS BELOW LINEAR LIMIT    Troponin-I, QT <0.02 0.0 - 0.045 NG/ML   ECHO ADULT COMPLETE    Collection Time: 01/20/20  8:53 AM   Result Value Ref Range    LA Volume 65.36 22 - 52 mL    LV E' Lateral Velocity 10.38 cm/s    LV E' Septal Velocity 10.75 cm/s    Tapse 2.72 (A) 1.5 - 2.0 cm    Ao Root D 2.87 cm    LVIDd 5.21 3.9 - 5.3 cm LVPWd 0.83 0.6 - 0.9 cm    LVIDs 3.27 cm    IVSd 0.81 0.6 - 0.9 cm    LVOT d 2.02 cm    LVOT Peak Velocity 103.52 cm/s    LVOT Peak Gradient 4.3 mmHg    LVOT VTI 20.58 cm    MV A Matthieu 77.52 cm/s    MV E Matthieu 81.58 cm/s    MV E/A 1.05     LA Vol 4C 69.41 (A) 22 - 52 mL    LA Vol 2C 49.04 22 - 52 mL    LA Area 4C 22.7 cm2    LV Mass .6 (A) 67 - 162 g    LV Mass AL Index 101.3 43 - 95 g/m2    E/E' lateral 7.86     E/E' septal 7.59     E/E' ratio (averaged) 7.72     Mitral Valve E Wave Deceleration Time 198.8 ms    Triscuspid Valve Regurgitation Peak Gradient 19.6 mmHg    TR Max Velocity 221.11 cm/s    LA Vol Index 38.37 16 - 28 ml/m2    LA Vol Index 28.79 16 - 28 ml/m2    LA Vol Index 40.74 16 - 28 ml/m2    Left Ventricular Fractional Shortening by 2D 43.271283406 %    Left Ventricular Outflow Tract Mean Gradient 1.968987862705 mmHg    Left Ventricular Outflow Tract Mean Velocity 9.58397509552425 cm/s    Mitral Valve Deceleration [place] 1.926811407912     Left Ventricular End Diastolic Volume by Teichholz Method 5.34600729805 mL    Left Ventricular End Systolic Volume by Teichholz Method 5.49538502856 mL    Left Ventricular Stroke Volume by Teichholz Method 20.451124447 mL   IRON PROFILE    Collection Time: 01/20/20  1:41 PM   Result Value Ref Range    Iron 14 (L) 50 - 175 ug/dL    TIBC 427 250 - 450 ug/dL    Iron % saturation 3 %   VITAMIN B12 & FOLATE    Collection Time: 01/20/20  1:41 PM   Result Value Ref Range    Vitamin B12 312 211 - 911 pg/mL    Folate 15.0 3.10 - 17.50 ng/mL       Signed By: Jose Gudino MD     January 20, 2020      Disclaimer: Sections of this note are dictated using utilizing voice recognition software. Minor typographical errors may be present. If questions arise, please do not hesitate to contact me or call our department.

## 2020-01-20 NOTE — PROGRESS NOTES
OT order received and chart reviewed. Patient off the unit for testing. Will continue to follow and see patient when available/as appropriate.             Thank you for this referral,   Iraj Aguirre MS, OTR/L

## 2020-01-20 NOTE — PROGRESS NOTES
PT orders received, chart reviewed. Pt unable to participate with PT due to:  []  Nausea/vomiting  []  Eating  []  Pain  []  Pt lethargic  [x]  Off Unit at MRI  []  Pt refused  []  Other   Will f/u later as patient's schedule allows.  Thank you for this referral.  Hansel Vicente, PT, DPT

## 2020-01-20 NOTE — PROGRESS NOTES
Echocardiogram completed. Patient returned to room with armband in place. Report to follow.     [address] [facility]

## 2020-01-20 NOTE — ED NOTES
TRANSFER - OUT REPORT:    Verbal report given to Ric Carrera RN(name) on Neha Pride  being transferred to [address] [place] [address](unit) for routine progression of care       Report consisted of patients Situation, Background, Assessment and   Recommendations(SBAR). Information from the following report(s) SBAR, ED Summary and MAR was reviewed with the receiving nurse. Lines:   Peripheral IV 01/19/20 Left Wrist (Active)   Site Assessment Clean, dry, & intact 1/19/2020  7:43 PM   Phlebitis Assessment 0 1/19/2020  7:43 PM   Infiltration Assessment 0 1/19/2020  7:43 PM   Dressing Status Clean, dry, & intact 1/19/2020  7:43 PM        Opportunity for questions and clarification was provided.

## 2020-01-20 NOTE — ROUTINE PROCESS
TRANSFER - IN REPORT: 
 
Verbal report received from [facility]) on Maurice Mehtaingale  being received from Dario Banegas RN(unit) for routine progression of care Report consisted of patients Situation, Background, Assessment and  
Recommendations(SBAR). Information from the following report(s) SBAR, Kardex, Intake/Output, MAR and Recent Results was reviewed with the receiving nurse. Opportunity for questions and clarification was provided. Assessment completed upon patients arrival to unit and care assumed.

## 2020-01-20 NOTE — PROGRESS NOTES
Problem: Dysphagia (Adult)  Goal: *Acute Goals and Plan of Care (Insert Text)  Description  Patient will:  1. Tolerate PO trials with 0 s/s overt distress in 4/5 trials  2. Utilize compensatory swallow strategies/maneuvers (decrease bite/sip, size/rate, alt. liq/sol) with min cues in 4/5 trials    Rec:     Reg solid with thin liquids  Aspiration precautions  HOB >45 during po intake, remain >30 for 30-45 minutes after po   Small bites/sips; alternate liquid/solid with slow feeding rate   Oral care TID  Meds per pt preference         Outcome: Progressing Towards Goal    SPEECH LANGUAGE PATHOLOGY BEDSIDE SWALLOW EVALUATION    Patient: Du Grandchild (62 y.o. female)  Date: 1/20/2020  Primary Diagnosis: Weakness of left upper extremity [R29.898]  Nausea and vomiting [R11.2]  UTI (urinary tract infection) [N39.0]        Precautions: aspiration  Fall  PLOF: As per H&P    ASSESSMENT :  Based on the objective data described below, the patient presents with oropharyngeal swallow fxn essentially WFL. Strength, ROM, and coordination of the orofacial musculature were all found to be [facility]. All structures were intact and symmetrical. The pt presented with adequate oral transit times on all consistencies. Mastication time was appropriate. No s/sx of aspiration noted; hyolaryngeal elevation and excursion appeared adequate on all consistencies. No oral stasis noted post swallow. The pt denied globus sensation post swallow. Speech production was fluent. No dysarthria was observed. Expressive/receptive speech production appeared WFL to informal observation. Pt communicated in sentences of appropriate form, content, and use. Rec reg solid diet with thin liquids, aspiration precautions, oral care TID, and meds as tolerated. ST will continue to follow x 1-2 visits to ensure safety of diet tolerance. Patient will benefit from skilled intervention to address the above impairments.   Patient's rehabilitation potential is considered to be Good  Factors which may influence rehabilitation potential include:   [x]            None noted     PLAN :  Recommendations and Planned Interventions: See above  Frequency/Duration: Patient will be followed by speech-language pathology x 1-2 more visits to address goals. Discharge Recommendations: None     SUBJECTIVE:   Patient stated Do you think you could bring me some stuff I could use to brush my teeth? .    OBJECTIVE:     Past Medical History:   Diagnosis Date    Anemia     Arthritis     Asthma     Cervical neck pain with evidence of disc disease     Fibromyalgia     Migraines     Nocturnal leg cramps 1/19/2020    TIA (transient ischemic attack) 1/19/2020     Past Surgical History:   Procedure Laterality Date    ABDOMEN SURGERY PROC UNLISTED      HX CHOLECYSTECTOMY      HX GASTRIC BYPASS      HX GYN      HX HYSTERECTOMY      HX ORTHOPAEDIC      L arm has steel bar 2007     Prior Level of Function/Home Situation: see below  Home Situation  Home Environment: Private residence  # Steps to Enter: 5  Rails to Enter: Yes  Hand Rails : Bilateral  One/Two Story Residence: One story  Living Alone: No  Support Systems: Family member(s)  Patient Expects to be Discharged to[de-identified] Private residence  Current DME Used/Available at Home: Grab bars  Tub or Shower Type: Shower    Diet prior to admission: reg solid with thin  Current Diet:  reg solid with thin      Cognitive and Communication Status:  Neurologic State: Alert  Orientation Level: Oriented X4  Cognition: Follows commands  Safety/Judgement: Fall prevention  Oral Assessment:  Oral Assessment  Labial: No impairment  Dentition: Natural;Intact  Oral Hygiene: adeuquate  Lingual: No impairment  Velum: No impairment  Mandible: No impairment  P.O. Trials:  Patient Position: [facility] at [facility]  Vocal quality prior to P.O.: No impairment  Consistency Presented: Thin liquid; Solid;Puree  How Presented: Self-fed/presented;Cup/sip;Spoon;Straw  Bolus Acceptance: No impairment  Bolus Formation/Control: No impairment  Propulsion: No impairment  Oral Residue: None  Initiation of Swallow: No impairment  Laryngeal Elevation: Functional  Aspiration Signs/Symptoms: None  Pharyngeal Phase Characteristics: No impairment, issues, or problems   Effective Modifications: None  Cues for Modifications: None     Oral Phase Severity: No impairment  Pharyngeal Phase Severity : No impairment    PAIN:  Start of Eval: 0  End of Eval: 0     After treatment:   []            Patient left in no apparent distress sitting up in chair  [x]            Patient left in no apparent distress in bed  [x]            Call bell left within reach  [x]            Nursing notified  []            Family present  []            Caregiver present  []            Bed alarm activated    COMMUNICATION/EDUCATION:   [x]            Aspiration precautions; swallow safety; compensatory techniques. [x]            Patient/family have participated as able in goal setting and plan of care.     Thank you for this referral.    Duncan Hatchet, M.S. CCC-SLP/L  Speech-Language Pathologist

## 2020-01-20 NOTE — ED NOTES
Pt laying with eyes closed,  Respirations even and unlabored. Pt responded to gentle voice. Denies ay pain at this time.

## 2020-01-20 NOTE — PROGRESS NOTES
Re:  Mike Sparrow up visit     1/20/2020 4:31 PM    SSN: [id number]    Subjective:   Beth Clifford was seen in follow-up today. Patient continues to have mild weakness on the left upper and lower extremities with numbness. No market changes in her speech. She also complained of some blurring of vision. She had MRI of the brain which is negative for an acute stroke. Also get transthoracic echo which is normal.  Her hemoglobin was found to be 7. She had a past history of anemia after gastric bypass surgery. Medications:    Current Facility-Administered Medications   Medication Dose Route Frequency Provider Last Rate Last Dose    iron sucrose (VENOFER) 200 mg in 0.9% sodium chloride 100 mL IVPB  200 mg IntraVENous Crystal Phlegm, Farhan Hinds MD        atorvastatin (LIPITOR) tablet 80 mg  80 mg Oral QHS Brie BYRD, DO        aspirin chewable tablet 81 mg  81 mg Oral DAILY Brie Armstrong A, DO        cefTRIAXone (ROCEPHIN) 1 g in sterile water (preservative free) 10 mL IV syringe  1 g IntraVENous Q24H Brie BYRD, DO   1 g at 01/20/20 1521    influenza vaccine 2019-20 (6 mos+)(PF) (FLUARIX/FLULAVAL/FLUZONE QUAD) injection 0.5 mL  0.5 mL IntraMUSCular PRIOR TO DISCHARGE Marline Costello DO        acetaminophen (TYLENOL) tablet 650 mg  650 mg Oral Q4H PRN Kali Nolan MD   650 mg at 01/19/20 2042       Vital signs:    Visit Vitals  /76   Pulse 80   Temp 97.2 °F (36.2 °C)   Resp 18   Ht 4' 11\" (1.499 m)   Wt 75.3 kg (166 lb)   SpO2 100%   Breastfeeding No   BMI 33.53 kg/m²       Review of Systems:   Constitutional no fever or chills  Skin denies rash or itching  HEENT  Denies tinnitus, hearing lose  Eyes: has blurring of vision.    Respiratory denies sortness of breath  Cardiovascular denies chest pain  Gastrointestinal denies nausea, diarrhea, constipation  Genitourinary denies incontinence  Musculoskeletal denies joint pain or swelling  Hematology denies active bleeding   Neurological as above in HPI      Patient Active Problem List   Diagnosis Code    UTI (urinary tract infection) N39.0    Nausea and vomiting R11.2    Weakness of left upper extremity R29.898    TIA (transient ischemic attack) G45.9    S/P gastric bypass Z98.84    Recurrent pyelonephritis N12    Positive blood culture R78.81    Microcytic anemia D50.9    Fibromyalgia M79.7    Acute coronary syndrome (HCC) I24.9    Nocturnal leg cramps G47.62         Objective:   General: awake and alert  CVS: RRR  Chest: no labored breathing. Mental status: Awake, alert, oriented x3, follows simple, complex and inverted commands, no neglect, no extinction to DSS or VSS  Speech and languge: fluent, coherent, naming and repitition intact, and comprehension intact  CN: VFF, EOMI, PERRLA, face sensation intact , no facial asymmetry noted, palate elevation symmetric bilat, SS+SCM 5/5 bilat, tongue midline  Motor: mild LUE pronator drift, tone normal throughout, strength 5/5 throughout(mild effort related weakness on the LUE). Sensory: intact to light touch throughout  Coordination: FNF, HS accurate w/o dysmetria  DTR: 2+ throughout, toes downgoing BL  Gait: not tested     Result Information     Status: Final result (Exam End: 1/20/2020 11:01) Provider Status: Open   Study Result     MRI BRAIN WITH AND WITHOUT CONTRAST     Provided Reason for Exam: \"mri\"  Additional History: Patient with episode of confusion, left facial numbness,  left-sided weakness  Comparison Studies: CT head 1/18/2020, 11/22/2015     Imaging Technique:     Sequences:  Sagittal and axial T1 weighted, Diffusion Weighted (DWI), Axial T2  weighted, Axial T2 FLAIR, and GRE MRI images of the brain. Post contrast axial  and coronal T1 images.     Contrast Material:  15 mL Dotarem IV     Limiting Factors/Major Artifacts: None.     FINDINGS:     Brain Parenchyma: Diffusion sequence negative. No cytotoxic edema.  No findings  of an acute infarction. Patchy increased T2 FLAIR signal in the periventricular  and subcortical white matter bilaterally. Corresponds with lucencies seen on  head CT. Distribution is nonspecific. The white matter lesions are not clearly  visible on T1 precontrast. No associated postcontrast enhancement. None of the  lesions clearly involve the corpus callosum. No chronic cortical infarcts or  chronic lacunar infarcts. No visible masses or midline shift.     CSF Spaces:  Normal in size and morphology for the patient's age. No  hydrocephalus. Vascular System:  Grossly patent flow in basilar and internal cerebral arteries. No findings of dural sinus thrombosis.      Hemorrhage:  No acute hemorrhage. No chronic microhemorrhage.     Other Structures:  Calvarium: No suspicious marrow signal.  Sella: The sella turcica is mildly expanded and fluid-filled with flattening of  the pituitary along the floor of the sella. Grossly similar findings on head CT  from 11/2015. Visualized Upper Cervical Spine: No Chiari malformation. No upper cervical  spinal canal stenosis. Orbits: Normal for non-dedicated exam.  Paranasal Sinuses: No significant paranasal sinus disease. Right maxillary sinus  retention cyst.  Mastoid Air Cells:  Clear.     IMPRESSION  IMPRESSION:     No acute intracranial abnormality. No mass, hemorrhage, or acute infarct.     Mild to moderate burden of nonspecific T2 FLAIR hyperintense cerebral white  matter signal changes. Not suggestive of demyelinating disease, most likely  chronic microvascular ischemic changes. TTE: Interpretation Summary     · Normal cavity size, wall thickness and systolic function (ejection fraction normal). Estimated left ventricular ejection fraction is 55 - 60%. Visually measured ejection fraction. No regional wall motion abnormality noted. Age-appropriate left ventricular diastolic function. · Agitated saline contrast study was performed and color flow Doppler was used.  There was no shunting at baseline or with Valsalva. · Mildly dilated left atrium. · Pulmonary arterial systolic pressure is 23 mmHg. CBC:   Lab Results   Component Value Date/Time    WBC 6.5 01/20/2020 04:50 AM    RBC 3.60 (L) 01/20/2020 04:50 AM    HGB 7.0 (L) 01/20/2020 04:50 AM    HCT 24.1 (L) 01/20/2020 04:50 AM    PLATELET 405 00/93/7615 04:50 AM     BMP:   Lab Results   Component Value Date/Time    Glucose 91 01/18/2020 10:05 PM    Sodium 141 01/18/2020 10:05 PM    Potassium 3.5 01/18/2020 10:05 PM    Chloride 112 (H) 01/18/2020 10:05 PM    CO2 22 01/18/2020 10:05 PM    BUN 14 01/18/2020 10:05 PM    Creatinine 0.95 01/18/2020 10:05 PM    Calcium 8.2 (L) 01/18/2020 10:05 PM     CMP:   Lab Results   Component Value Date/Time    Glucose 91 01/18/2020 10:05 PM    Sodium 141 01/18/2020 10:05 PM    Potassium 3.5 01/18/2020 10:05 PM    Chloride 112 (H) 01/18/2020 10:05 PM    CO2 22 01/18/2020 10:05 PM    BUN 14 01/18/2020 10:05 PM    Creatinine 0.95 01/18/2020 10:05 PM    Calcium 8.2 (L) 01/18/2020 10:05 PM    Anion gap 7 01/18/2020 10:05 PM    BUN/Creatinine ratio 15 01/18/2020 10:05 PM    Alk. phosphatase 118 (H) 01/18/2020 10:05 PM    Protein, total 7.9 01/18/2020 10:05 PM    Albumin 3.5 01/18/2020 10:05 PM    Globulin 4.4 (H) 01/18/2020 10:05 PM    A-G Ratio 0.8 01/18/2020 10:05 PM       Assessment:  A 62years old female patient with medical history of fibromyalgia and migraine came to the emergency room for sudden weakness and numbness of the left upper and lower extremities and with left facial droop. In addition she had some blurring of vision mainly on the right side. She is improving currently. MRI of the brain is negative for an acute stroke. She had a transthoracic echo which is negative. Less likely to be stroke or TIA. Will complete the stroke work-up by doing carotid ultrasound. Patient has severe anemia with a hemoglobin of 7.   Will be admitted for treatment and work-up of the anemia. Sincerely,      David Laura M.D. PLEASE NOTE:   This document has been produced using voice recognition software. Unrecognized errors in transcription may be present.

## 2020-01-20 NOTE — PROGRESS NOTES
Problem: Self Care Deficits Care Plan (Adult)  Goal: *Acute Goals and Plan of Care (Insert Text)  Outcome: Resolved/Met     OCCUPATIONAL THERAPY EVALUATION/DISCHARGE    Patient: Lakshmi Davalos (10 y.o. female)  Date: 1/20/2020  Primary Diagnosis: Weakness of left upper extremity [R29.898]  Nausea and vomiting [R11.2]  UTI (urinary tract infection) [N39.0]  Precautions: Fall  PLOF: Patient was independent with self-care and functional mobility PTA. ASSESSMENT AND RECOMMENDATIONS:  Pt cleared to participate in OT evaluation by RN. Upon entering the ED room, pt was supine in bed, alert, and agreeable to participate in OT evaluation. Pt was seen with PT to maximize pt safety and participation. Patient is modified independent - independent with basic self-care and stand by assistance for bed mobility/functional transfers using rolling walker. The pt presents with good static standing and fair dynamic standing balance, however will defer to PT for functional balance and functional mobility tasks. Patient with c/o numbness in L forearm, however able to identify LT in AROM/gross motor skills functional. Patient demos decreased near vision in her R eye this session with L eye occluded, far vision intact. Based on the objective data described below, the patient presents with no deficits that impede pt function with ADLs, functional transfers, and functional mobility. At this time patient is safe to d/c home with family support. OT to d/c from caseload at this time. Skilled occupational therapy is not indicated at this time.   Discharge Recommendations: Home Health  Further Equipment Recommendations for Discharge: rolling walker      SUBJECTIVE:   Patient stated I just wish I could wash up in a tub    OBJECTIVE DATA SUMMARY:     Past Medical History:   Diagnosis Date    Anemia     Arthritis     Asthma     Cervical neck pain with evidence of disc disease     Fibromyalgia     Migraines     Nocturnal leg cramps 1/19/2020 TIA (transient ischemic attack) 1/19/2020     Past Surgical History:   Procedure Laterality Date    ABDOMEN SURGERY PROC UNLISTED      HX CHOLECYSTECTOMY      HX GASTRIC BYPASS      HX GYN      HX HYSTERECTOMY      HX ORTHOPAEDIC      L arm has steel bar 2007     Barriers to Learning/Limitations: yes;  sensory deficits-vision  Compensate with: visual, verbal, tactile, kinesthetic cues/model    Home Situation:   Home Situation  Home Environment: Private residence  # Steps to Enter: 5  Rails to Enter: Yes  Hand Rails : Bilateral  One/Two Story Residence: One story  Living Alone: No  Support Systems: Family member(s)  Patient Expects to be Discharged to[de-identified] Private residence  Current DME Used/Available at Home: Grab bars  Tub or Shower Type: Shower  [x]     Right hand dominant   []     Left hand dominant    Cognitive/Behavioral Status:  Neurologic State: Alert  Orientation Level: Oriented X4  Cognition: Follows commands  Safety/Judgement: Fall prevention    Skin: Intact  Edema: None noted    Vision/Perceptual:    Tracking: Able to track stimulus in all quadrants w/o difficulty    Acuity: Able to read clock/calendar on wall without difficulty; Impaired near vision    Corrective Lenses: Glasses    Coordination: BUE  Coordination: Within functional limits  Fine Motor Skills-Upper: Left Intact; Right Intact    Gross Motor Skills-Upper: Left Intact; Right Intact    Balance:  Sitting: Intact  Standing: Impaired  Standing - Static: Good  Standing - Dynamic : Fair    Strength: BUE  Strength: Generally decreased, functional(LUE)    Tone & Sensation: BUE  Tone: Normal  Sensation: Impaired(L forearm)    Range of Motion: BUE  AROM: Within functional limits    Functional Mobility and Transfers for ADLs:  Bed Mobility:  Supine to Sit: Stand-by assistance  Sit to Supine: Stand-by assistance  Scooting: Stand-by assistance    Transfers:  Sit to Stand: Stand-by assistance  Stand to Sit: Stand-by assistance   Toilet Transfer : Stand-by assistance(simualtion with chair)     ADL Assessment:  Feeding: Modified independent    Oral Facial Hygiene/Grooming: Modified Independent    Bathing: Stand-by assistance    Upper Body Dressing: Modified independent    Lower Body Dressing: Stand-by assistance    Toileting: Stand by assistance    ADL Intervention:  Feeding  Feeding Assistance: Modified independent  Food to Mouth: Modified independent    Grooming  Grooming Assistance: Modified independent  Washing Face: Modified independent  Brushing/Combing Hair: Modified independent    Lower Body Dressing Assistance  Dressing Assistance: (mod(I) seated, would require SBA standing)  Socks: Modified independent  Slip on Shoes with Back: Modified independent  Position Performed: Seated edge of bed    Cognitive Retraining  Safety/Judgement: Fall prevention    Pain:  Pain level pre-treatment: 7/10, pressure in head  Pain level post-treatment: 7/10, same  Pain Intervention(s): Medication (see MAR); Response to intervention: See doc flow    Activity Tolerance:   Good    Please refer to the flowsheet for vital signs taken during this treatment. After treatment:   []  Patient left in no apparent distress sitting up in chair  [x]  Patient left in no apparent distress in bed  [x]  Call bell left within reach  [x]  Nursing notified  []  Caregiver present  []  Bed alarm activated    COMMUNICATION/EDUCATION:   [x]      Role of Occupational Therapy in the acute care setting  [x]      Home safety education was provided and the patient/caregiver indicated understanding. [x]      Patient/family have participated as able and agree with findings and recommendations. []      Patient is unable to participate in plan of care at this time. Thank you for this referral.  EDIN Lopez/L  Time Calculation: 23 mins      Eval Complexity: History: LOW Complexity : Brief history review ;    Examination: LOW Complexity : 1-3 performance deficits relating to physical, cognitive , or psychosocial skils that result in activity limitations and / or participation restrictions ;    Decision Making:LOW Complexity : No comorbidities that affect functional and no verbal or physical assistance needed to complete eval tasks

## 2020-01-20 NOTE — PROGRESS NOTES
Problem: Mobility Impaired (Adult and Pediatric)  Goal: *Acute Goals and Plan of Care (Insert Text)  Description  Physical Therapy Goals  Initiated 1/20/2020 and to be accomplished within 7 day(s)  1. Patient will move from supine to sit and sit to supine , scoot up and down and roll side to side in bed with modified independence. 2.  Patient will transfer from bed to chair and chair to bed with modified independence using the least restrictive device. 3.  Patient will perform sit to stand with modified independence. 4.  Patient will ambulate with modified independence for >300 feet with the least restrictive device. 5.  Patient will ascend/descend 5 stairs with 1-2 handrail(s) with supervision/set-up. Prior Level of Function:   Patient was independence for all mobility including gait using no AD. Patient lives with boyfriend in a single story home 5 steps to enter B handrail assist. Pt works as a manager at [employer]. Outcome: Progressing Towards Goal   PHYSICAL THERAPY EVALUATION    Patient: Veronica Lewis (89 y.o. female)  Date: 1/20/2020  Primary Diagnosis: Weakness of left upper extremity [R29.898]  Nausea and vomiting [R11.2]  UTI (urinary tract infection) [N39.0]        Precautions:   Fall    ASSESSMENT :  PT orders received and patient cleared by nursing to participate with therapy. Patient is a 62 y.o. female admitted to the hospital due to migraine vs CVA. Pt has history of migraines, vertigo, fibromyalgia. Pt is having some L facial droop and L sided weakness. Patient consents to PT evaluation and treatment. Performed co-treatment with occupational therapy to increase participation, safety, and functional mobility. Pt seen in the ED. Pt does have some weakness on L LE especially with ankle DF compared to R LE. Pt has good sensation and coordination. Bed mobility and transfers standby assistance.  Gait mostly standby assistance but did have 1 LOB without the RW with contact guard assistance for safety. Gait 250 feet part no AD but is safest with the RW at this time due to dizziness and decreased balance. Pt ended therapy supine in bed with all needs met. Educated on calling for assistance for safety. Patient will benefit from skilled intervention to address the above impairments. Patient's rehabilitation potential is considered to be Good  Factors which may influence rehabilitation potential include:    []         None noted  [x]         Mental ability/status  [x]         Medical condition  []         Home/family situation and support systems  []         Safety awareness  []         Pain tolerance/management  []         Other:      PLAN :  Recommendations and Planned Interventions:    [x]           Bed Mobility Training             [x]    Neuromuscular Re-Education  [x]           Transfer Training                   []    Orthotic/Prosthetic Training  [x]           Gait Training                          [x]    Modalities  [x]           Therapeutic Exercises           [x]    Edema Management/Control  [x]           Therapeutic Activities            [x]    Family Training/Education  [x]           Patient Education  []           Other (comment):    Frequency/Duration: Patient will be followed by physical therapy 1-2 times per day/4-7 days per week to address goals. Discharge Recommendations: Home Health vs outpatient  Further Equipment Recommendations for Discharge: rolling walker     SUBJECTIVE:   Patient stated If you ask my daughter, I'm dizzy all the time.     OBJECTIVE DATA SUMMARY:     Past Medical History:   Diagnosis Date    Anemia     Arthritis     Asthma     Cervical neck pain with evidence of disc disease     Fibromyalgia     Migraines     Nocturnal leg cramps 1/19/2020    TIA (transient ischemic attack) 1/19/2020     Past Surgical History:   Procedure Laterality Date    ABDOMEN SURGERY PROC UNLISTED      HX CHOLECYSTECTOMY      HX GASTRIC BYPASS      HX GYN      HX HYSTERECTOMY      HX ORTHOPAEDIC      L arm has steel bar 2007     Barriers to Learning/Limitations: none  Compensate with: N/A  Home Situation:  Home Situation  Home Environment: Private residence  # Steps to Enter: 5  Rails to Enter: Yes  Hand Rails : Bilateral  One/Two Story Residence: One story  Living Alone: No  Support Systems: Family member(s)  Patient Expects to be Discharged to[de-identified] Private residence  Current DME Used/Available at Home: Grab bars  Tub or Shower Type: Shower  Critical Behavior:  Neurologic State: Alert  Orientation Level: Oriented X4  Cognition: Follows commands  Safety/Judgement: Fall prevention  Psychosocial  Patient Behaviors: Calm; Cooperative                 B LE Strength:    Strength: (R LE 5/5, L LE hip 4/5 knee 4+/5 ankle DF 4-/5)              B LE Tone & Sensation:   Tone: Normal          Sensation: Intact           B LE Range Of Motion:  AROM: Within functional limits                 Posture:  Posture (WDL): Exceptions to WDL  Posture Assessment: Forward head  Functional Mobility:  Bed Mobility:     Supine to Sit: Stand-by assistance  Sit to Supine: Stand-by assistance  Scooting: Stand-by assistance  Transfers:  Sit to Stand: Stand-by assistance   Stand to Sit: Stand-by assistance                       Balance:   Sitting: Intact  Standing: Impaired; Without support  Standing - Static: Good  Standing - Dynamic : Fair    Ambulation/Gait Training:  Distance (ft): 250 Feet (ft)  Assistive Device: Walker, rolling(none initially then using RW)  Ambulation - Level of Assistance: Contact guard assistance;Stand-by assistance(1 LOB with CGA for safety)  Gait Abnormalities: Decreased step clearance; Path deviations  Base of Support: Center of gravity altered  Speed/Daya: Pace decreased (<100 feet/min)  Step Length: Left shortened;Right shortened       Therapeutic Exercises:   Reviewed and performed ankle pumps to increase blood flow and circulation.        Pain:  Pain level pre-treatment: 7/10 pressure in head  Pain level post-treatment: same  Pain Intervention(s) : Medication (see MAR); Rest, Repositioning  Response to intervention: Nurse notified, See doc flow    Activity Tolerance:   good  Please refer to the flowsheet for vital signs taken during this treatment. After treatment:   []         Patient left in no apparent distress sitting up in chair  [x]         Patient left in no apparent distress in bed  [x]         Call bell left within reach  [x]   Personal items in reach   [x]         Nursing notified Neo Ibrahim  []         Caregiver present  []         Bed/chair alarm activated  []         SCDs applied     COMMUNICATION/EDUCATION:   [x]         Role of Physical Therapy in the acute care setting. [x]         Fall prevention education was provided and the patient/caregiver indicated understanding. [x]         Patient/family have participated as able in goal setting and plan of care. [x]         Patient/family agree to work toward stated goals and plan of care. []         Patient understands intent and goals of therapy, but is neutral about his/her participation. []         Patient is unable to participate in goal setting/plan of care: ongoing with therapy staff. [x]         Out of bed with nursing assistance 3-5 times a day. []         Other:     Thank you for this referral.  Savanna Scherer, PT, DPT   Time Calculation: 23 mins      Eval Complexity: History: MEDIUM  Complexity : 1-2 comorbidities / personal factors will impact the outcome/ POC Exam:HIGH Complexity : 4+ Standardized tests and measures addressing body structure, function, activity limitation and / or participation in recreation  Presentation: MEDIUM Complexity : Evolving with changing characteristics  Clinical Decision Making:Medium Complexity    Overall Complexity:MEDIUM

## 2020-01-21 ENCOUNTER — APPOINTMENT (OUTPATIENT)
Dept: VASCULAR SURGERY | Age: 55
DRG: 812 | End: 2020-01-21
Attending: STUDENT IN AN ORGANIZED HEALTH CARE EDUCATION/TRAINING PROGRAM
Payer: COMMERCIAL

## 2020-01-21 VITALS
WEIGHT: 166 LBS | OXYGEN SATURATION: 97 % | HEIGHT: 59 IN | TEMPERATURE: 98.1 F | DIASTOLIC BLOOD PRESSURE: 74 MMHG | HEART RATE: 81 BPM | SYSTOLIC BLOOD PRESSURE: 119 MMHG | BODY MASS INDEX: 33.47 KG/M2 | RESPIRATION RATE: 18 BRPM

## 2020-01-21 LAB
GLUCOSE BLD STRIP.AUTO-MCNC: 93 MG/DL (ref 70–110)
HCT VFR BLD AUTO: 27.5 % (ref 35–45)
HGB BLD-MCNC: 7.7 G/DL (ref 12–16)
LEFT CCA DIST DIAS: 31.2 CM/S
LEFT CCA DIST SYS: 84.7 CM/S
LEFT CCA MID DIAS: 23.35 CM/S
LEFT CCA MID SYS: 75.52 CM/S
LEFT CCA PROX DIAS: 27.3 CM/S
LEFT CCA PROX SYS: 87.3 CM/S
LEFT ECA DIAS: 8 CM/S
LEFT ECA SYS: 55.9 CM/S
LEFT ICA DIST DIAS: 26.3 CM/S
LEFT ICA DIST SYS: 61.1 CM/S
LEFT ICA MID DIAS: 44.5 CM/S
LEFT ICA MID SYS: 106.4 CM/S
LEFT ICA PROX DIAS: 24.4 CM/S
LEFT ICA PROX SYS: 83.8 CM/S
LEFT ICA/CCA SYS: 1.26
LEFT SUBCLAVIAN DIAS: 5.1 CM/S
LEFT SUBCLAVIAN SYS: 79.4 CM/S
LEFT VERTEBRAL DIAS: 17.51 CM/S
LEFT VERTEBRAL SYS: 51.7 CM/S
RIGHT CCA DIST DIAS: 27.3 CM/S
RIGHT CCA DIST SYS: 72.7 CM/S
RIGHT CCA MID DIAS: 29.48 CM/S
RIGHT CCA MID SYS: 102.52 CM/S
RIGHT CCA PROX DIAS: 26.9 CM/S
RIGHT CCA PROX SYS: 111.7 CM/S
RIGHT ICA DIST DIAS: 60.3 CM/S
RIGHT ICA DIST SYS: 136.2 CM/S
RIGHT ICA MID DIAS: 43 CM/S
RIGHT ICA MID SYS: 114.1 CM/S
RIGHT ICA PROX DIAS: 30.8 CM/S
RIGHT ICA PROX SYS: 83.1 CM/S
RIGHT ICA/CCA SYS: 1.9
RIGHT SUBCLAVIAN DIAS: 0 CM/S
RIGHT SUBCLAVIAN SYS: 67.7 CM/S
RIGHT VERTEBRAL DIAS: 17.08 CM/S
RIGHT VERTEBRAL SYS: 61.1 CM/S

## 2020-01-21 PROCEDURE — 90686 IIV4 VACC NO PRSV 0.5 ML IM: CPT | Performed by: HOSPITALIST

## 2020-01-21 PROCEDURE — 74011250637 HC RX REV CODE- 250/637: Performed by: EMERGENCY MEDICINE

## 2020-01-21 PROCEDURE — 74011000250 HC RX REV CODE- 250: Performed by: HOSPITALIST

## 2020-01-21 PROCEDURE — 77010033678 HC OXYGEN DAILY: Performed by: HOSPITALIST

## 2020-01-21 PROCEDURE — 85018 HEMOGLOBIN: CPT

## 2020-01-21 PROCEDURE — 77030041247 HC PROTECTOR HEEL HEELMEDIX MDII -B

## 2020-01-21 PROCEDURE — 74011250637 HC RX REV CODE- 250/637: Performed by: INTERNAL MEDICINE

## 2020-01-21 PROCEDURE — 90471 IMMUNIZATION ADMIN: CPT

## 2020-01-21 PROCEDURE — 93880 EXTRACRANIAL BILAT STUDY: CPT

## 2020-01-21 PROCEDURE — 74011250636 HC RX REV CODE- 250/636: Performed by: HOSPITALIST

## 2020-01-21 PROCEDURE — 82962 GLUCOSE BLOOD TEST: CPT

## 2020-01-21 PROCEDURE — 74011250636 HC RX REV CODE- 250/636: Performed by: INTERNAL MEDICINE

## 2020-01-21 PROCEDURE — 36415 COLL VENOUS BLD VENIPUNCTURE: CPT

## 2020-01-21 RX ORDER — GUAIFENESIN 100 MG/5ML
81 LIQUID (ML) ORAL DAILY
Qty: 30 TAB | Refills: 0 | Status: SHIPPED | OUTPATIENT
Start: 2020-01-22

## 2020-01-21 RX ORDER — CYANOCOBALAMIN 1000 UG/ML
1000 INJECTION, SOLUTION INTRAMUSCULAR; SUBCUTANEOUS DAILY
Qty: 1 VIAL | Refills: 0 | Status: SHIPPED | OUTPATIENT
Start: 2020-01-21

## 2020-01-21 RX ORDER — ONDANSETRON 2 MG/ML
4 INJECTION INTRAMUSCULAR; INTRAVENOUS
Status: DISCONTINUED | OUTPATIENT
Start: 2020-01-21 | End: 2020-01-21 | Stop reason: HOSPADM

## 2020-01-21 RX ORDER — LANOLIN ALCOHOL/MO/W.PET/CERES
325 CREAM (GRAM) TOPICAL
Qty: 30 TAB | Refills: 0 | Status: SHIPPED
Start: 2020-01-21

## 2020-01-21 RX ORDER — ATORVASTATIN CALCIUM 80 MG/1
80 TABLET, FILM COATED ORAL
Qty: 30 TAB | Refills: 0 | Status: SHIPPED | OUTPATIENT
Start: 2020-01-21 | End: 2020-01-21

## 2020-01-21 RX ORDER — CYANOCOBALAMIN 1000 UG/ML
1000 INJECTION, SOLUTION INTRAMUSCULAR; SUBCUTANEOUS ONCE
Status: COMPLETED | OUTPATIENT
Start: 2020-01-21 | End: 2020-01-21

## 2020-01-21 RX ADMIN — CEFTRIAXONE SODIUM 1 G: 1 INJECTION, POWDER, FOR SOLUTION INTRAMUSCULAR; INTRAVENOUS at 12:43

## 2020-01-21 RX ADMIN — ACETAMINOPHEN 650 MG: 325 TABLET ORAL at 07:43

## 2020-01-21 RX ADMIN — CYANOCOBALAMIN 1000 MCG: 1000 INJECTION, SOLUTION INTRAMUSCULAR at 17:44

## 2020-01-21 RX ADMIN — INFLUENZA VIRUS VACCINE 0.5 ML: 15; 15; 15; 15 SUSPENSION INTRAMUSCULAR at 17:44

## 2020-01-21 RX ADMIN — ACETAMINOPHEN, ASPIRIN AND CAFFEINE 2 TABLET: 250; 250; 65 TABLET, FILM COATED ORAL at 12:41

## 2020-01-21 RX ADMIN — ONDANSETRON 4 MG: 2 INJECTION INTRAMUSCULAR; INTRAVENOUS at 12:41

## 2020-01-21 NOTE — PROGRESS NOTES
Problem: Patient Education: Go to Patient Education Activity  Goal: Patient/Family Education  Outcome: Progressing Towards Goal     Problem: TIA/CVA Stroke: Day 2 Until Discharge  Goal: Off Pathway (Use only if patient is Off Pathway)  Outcome: Progressing Towards Goal  Goal: Activity/Safety  Outcome: Progressing Towards Goal  Goal: Diagnostic Test/Procedures  Outcome: Progressing Towards Goal  Goal: Nutrition/Diet  Outcome: Progressing Towards Goal  Goal: Discharge Planning  Outcome: Progressing Towards Goal  Goal: Medications  Outcome: Progressing Towards Goal  Goal: Respiratory  Outcome: Progressing Towards Goal  Goal: Treatments/Interventions/Procedures  Outcome: Progressing Towards Goal  Goal: Psychosocial  Outcome: Progressing Towards Goal  Goal: *Verbalizes anxiety and depression are reduced or absent  Outcome: Progressing Towards Goal  Goal: *Absence of aspiration  Outcome: Progressing Towards Goal  Goal: *Absence of deep venous thrombosis signs and symptoms(Stroke Metric)  Outcome: Progressing Towards Goal  Goal: *Optimal pain control at patient's stated goal  Outcome: Progressing Towards Goal  Goal: *Tolerating diet  Outcome: Progressing Towards Goal  Goal: *Ability to perform ADLs and demonstrates progressive mobility and function  Outcome: Progressing Towards Goal  Goal: *Stroke education continued(Stroke Metric)  Outcome: Progressing Towards Goal     Problem: Ischemic Stroke: Discharge Outcomes  Goal: *Verbalizes anxiety and depression are reduced or absent  Outcome: Progressing Towards Goal  Goal: *Verbalize understanding of risk factor modification(Stroke Metric)  Outcome: Progressing Towards Goal  Goal: *Hemodynamically stable  Outcome: Progressing Towards Goal  Goal: *Absence of aspiration pneumonia  Outcome: Progressing Towards Goal  Goal: *Aware of needed dietary changes  Outcome: Progressing Towards Goal  Goal: *Verbalize understanding of prescribed medications including anti-coagulants, anti-lipid, and/or anti-platelets(Stroke Metric)  Outcome: Progressing Towards Goal  Goal: *Tolerating diet  Outcome: Progressing Towards Goal  Goal: *Aware of follow-up diagnostics related to anticoagulants  Outcome: Progressing Towards Goal  Goal: *Ability to perform ADLs and demonstrates progressive mobility and function  Outcome: Progressing Towards Goal  Goal: *Absence of DVT(Stroke Metric)  Outcome: Progressing Towards Goal  Goal: *Absence of aspiration  Outcome: Progressing Towards Goal  Goal: *Optimal pain control at patient's stated goal  Outcome: Progressing Towards Goal  Goal: *Home safety concerns addressed  Outcome: Progressing Towards Goal  Goal: *Describes available resources and support systems  Outcome: Progressing Towards Goal  Goal: *Verbalizes understanding of activation of EMS(911) for stroke symptoms(Stroke Metric)  Outcome: Progressing Towards Goal  Goal: *Understands and describes signs and symptoms to report to providers(Stroke Metric)  Outcome: Progressing Towards Goal  Goal: *Neurolgocially stable (absence of additional neurological deficits)  Outcome: Progressing Towards Goal  Goal: *Verbalizes importance of follow-up with primary care physician(Stroke Metric)  Outcome: Progressing Towards Goal  Goal: *Smoking cessation discussed,if applicable(Stroke Metric)  Outcome: Progressing Towards Goal  Goal: *Depression screening completed(Stroke Metric)  Outcome: Progressing Towards Goal     Problem: Urinary Tract Infection - Adult  Goal: *Absence of infection signs and symptoms  Outcome: Progressing Towards Goal     Problem: Patient Education: Go to Patient Education Activity  Goal: Patient/Family Education  Outcome: Progressing Towards Goal     Problem: General Medical Care Plan  Goal: *Vital signs within specified parameters  Outcome: Progressing Towards Goal  Goal: *Labs within defined limits  Outcome: Progressing Towards Goal  Goal: *Absence of infection signs and symptoms  Outcome: Progressing Towards Goal  Goal: *Optimal pain control at patient's stated goal  Outcome: Progressing Towards Goal  Goal: *Skin integrity maintained  Outcome: Progressing Towards Goal  Goal: *Fluid volume balance  Outcome: Progressing Towards Goal  Goal: *Optimize nutritional status  Outcome: Progressing Towards Goal  Goal: *Anxiety reduced or absent  Outcome: Progressing Towards Goal  Goal: *Progressive mobility and function (eg: ADL's)  Outcome: Progressing Towards Goal     Problem: Patient Education: Go to Patient Education Activity  Goal: Patient/Family Education  Outcome: Progressing Towards Goal     Problem: Pain  Goal: *Control of Pain  Outcome: Progressing Towards Goal  Goal: *PALLIATIVE CARE:  Alleviation of Pain  Outcome: Progressing Towards Goal     Problem: Patient Education: Go to Patient Education Activity  Goal: Patient/Family Education  Outcome: Progressing Towards Goal     Problem: Injury - Risk of, Adverse Drug Event  Goal: *Absence of adverse drug events  Outcome: Progressing Towards Goal  Goal: *Absence of medication errors  Outcome: Progressing Towards Goal  Goal: *Knowledge of prescribed medications  Outcome: Progressing Towards Goal     Problem: Patient Education: Go to Patient Education Activity  Goal: Patient/Family Education  Outcome: Progressing Towards Goal     Problem: Falls - Risk of  Goal: *Absence of Falls  Description  Document Vivi Yang Fall Risk and appropriate interventions in the flowsheet.   Outcome: Progressing Towards Goal  Note: Fall Risk Interventions:            Medication Interventions: Teach patient to arise slowly    Elimination Interventions: Call light in reach

## 2020-01-21 NOTE — PROGRESS NOTES
Assisted patient with the execution of Advance Medical Directive. Placed the copy into patient's \"like paper chart. \"  See Flow Sheet for other pastoral interventions. Chaplains will continue to follow and provide pastoral care on an as needed/requested basis.     [id number] Perry Bloom [phone]

## 2020-01-21 NOTE — ROUTINE PROCESS
Bedside and Verbal shift change [facility] by Rosy Gee RN (offgoing nurse). Report included the following information SBAR, Kardex, Intake/Output, MAR and Recent Results.

## 2020-01-21 NOTE — PROGRESS NOTES
conducted an initial consultation and Spiritual Assessment for Claudio Guerra, who is a 62 y.o.,female. Patient's Primary Language is: [place]. According to the patient's EMR Buddhism Affiliation is: [place]. The reason the Patient came to the hospital is:   Patient Active Problem List    Diagnosis Date Noted    UTI (urinary tract infection) 01/19/2020    Nausea and vomiting 01/19/2020    Weakness of left upper extremity 01/19/2020    TIA (transient ischemic attack) 01/19/2020    Nocturnal leg cramps 01/19/2020    Fibromyalgia 10/13/2017    Acute coronary syndrome ([facility] [address] 10/13/2017    S/P gastric bypass 08/04/2012    Recurrent pyelonephritis 08/04/2012    Positive blood culture 08/04/2012    Microcytic anemia 08/04/2012        The  provided the following Interventions:  Initiated a relationship of care and support. Explored issues of jana, belief, spirituality and Mormonism/ritual needs while hospitalized. Listened empathically. Provided chaplaincy education. Provided information about Spiritual Care Services. Offered prayer and assurance of continued prayers on patient's behalf. Chart reviewed. The following outcomes where achieved:  Patient shared limited information about both their medical narrative and spiritual journey/beliefs.  confirmed Patient's Buddhism Affiliation. Patient processed feeling about current hospitalization. Patient expressed gratitude for 's visit. Assessment:  Patient does not have any Mormonism/cultural needs that will affect patient's preferences in health care. There are no spiritual or Mormonism issues which require intervention at this time. Plan:  Chaplains will continue to follow and will provide pastoral care on an as needed/requested basis.  recommends bedside caregivers page  on duty if patient shows signs of acute spiritual or emotional distress.     [id number] Perry Bloom [phone]

## 2020-01-21 NOTE — PROGRESS NOTES
Re:  Tianna Carvalho up visit     1/21/2020 4:31 PM    SSN: [id number]    Subjective:   Doc Haile was seen in follow-up today. She is feeling better today and has no difficulty walking. No marked weakness of the extremities. No marked sensory changes. She has an attack of her migraine today with nausea. TTE is unremarkable. MRI negative for acute stroke. CDUS: no hemodynamically significant stenosis of the carotids. Medications:    Current Facility-Administered Medications   Medication Dose Route Frequency Provider Last Rate Last Dose    aspirin-acetaminophen-caffeine (EXCEDRIN ES) per tablet 2 Tab  2 Tab Oral Q6H PRN Dev Marcial MD   2 Tab at 01/21/20 1241    ondansetron (ZOFRAN) injection 4 mg  4 mg IntraVENous Q6H PRN Dev Marcial MD   4 mg at 01/21/20 1241    cyanocobalamin (VITAMIN B12) injection 1,000 mcg  1,000 mcg IntraMUSCular Sarah Koroma MD        atorvastatin (LIPITOR) tablet 80 mg  80 mg Oral QHS Gladis BYRD DO   80 mg at 01/20/20 2148    aspirin chewable tablet 81 mg  81 mg Oral DAILY Horacio Juarez DO        influenza vaccine 2019-20 (6 mos+)(PF) (FLUARIX/FLULAVAL/FLUZONE QUAD) injection 0.5 mL  0.5 mL IntraMUSCular PRIOR TO DISCHARGE Horacio Juarez DO        acetaminophen (TYLENOL) tablet 650 mg  650 mg Oral Q4H PRN Fatoumata Mcgrath MD   650 mg at 01/21/20 0743       Vital signs:    Visit Vitals  /74 (BP 1 Location: Right arm, BP Patient Position: Supine)   Pulse 81   Temp 98.1 °F (36.7 °C)   Resp 18   Ht 4' 11\" (1.499 m)   Wt 75.3 kg (166 lb)   SpO2 97%   Breastfeeding No   BMI 33.53 kg/m²       Review of Systems:   Constitutional no fever or chills  Skin denies rash or itching  HEENT  Denies tinnitus, hearing lose  Eyes: has blurring of vision.    Respiratory denies sortness of breath  Cardiovascular denies chest pain  Gastrointestinal denies nausea, diarrhea, constipation  Genitourinary denies incontinence  Musculoskeletal denies joint pain or swelling  Hematology denies active bleeding   Neurological as above in HPI      Patient Active Problem List   Diagnosis Code    UTI (urinary tract infection) N39.0    Nausea and vomiting R11.2    Weakness of left upper extremity R29.898    TIA (transient ischemic attack) G45.9    S/P gastric bypass Z98.84    Recurrent pyelonephritis N12    Positive blood culture R78.81    Microcytic anemia D50.9    Fibromyalgia M79.7    Acute coronary syndrome (HCC) I24.9    Nocturnal leg cramps G47.62         Objective:   General: awake and alert  CVS: RRR  Chest: no labored breathing. Mental status: Awake, alert, oriented x3, follows simple, complex and inverted commands, no neglect, no extinction to DSS or VSS  Speech and languge: fluent, coherent, naming and repitition intact, and comprehension intact  CN: VFF, EOMI, PERRLA, face sensation intact , no facial asymmetry noted, palate elevation symmetric bilat, SS+SCM 5/5 bilat, tongue midline  Motor: mild LUE pronator drift, tone normal throughout, strength 5/5 throughout(mild effort related weakness on the LUE). Sensory: intact to light touch throughout  Coordination: FNF, HS accurate w/o dysmetria  DTR: 2+ throughout, toes downgoing BL  Gait: not tested     Result Information     Status: Final result (Exam End: 1/20/2020 11:01) Provider Status: Open   Study Result     MRI BRAIN WITH AND WITHOUT CONTRAST     Provided Reason for Exam: \"mri\"  Additional History: Patient with episode of confusion, left facial numbness,  left-sided weakness  Comparison Studies: CT head 1/18/2020, 11/22/2015     Imaging Technique:     Sequences:  Sagittal and axial T1 weighted, Diffusion Weighted (DWI), Axial T2  weighted, Axial T2 FLAIR, and GRE MRI images of the brain.  Post contrast axial  and coronal T1 images.     Contrast Material:  15 mL Dotarem IV     Limiting Factors/Major Artifacts: None.     FINDINGS:     Brain Parenchyma: Diffusion sequence negative. No cytotoxic edema. No findings  of an acute infarction. Patchy increased T2 FLAIR signal in the periventricular  and subcortical white matter bilaterally. Corresponds with lucencies seen on  head CT. Distribution is nonspecific. The white matter lesions are not clearly  visible on T1 precontrast. No associated postcontrast enhancement. None of the  lesions clearly involve the corpus callosum. No chronic cortical infarcts or  chronic lacunar infarcts. No visible masses or midline shift.     CSF Spaces:  Normal in size and morphology for the patient's age. No  hydrocephalus. Vascular System:  Grossly patent flow in basilar and internal cerebral arteries. No findings of dural sinus thrombosis.      Hemorrhage:  No acute hemorrhage. No chronic microhemorrhage.     Other Structures:  Calvarium: No suspicious marrow signal.  Sella: The sella turcica is mildly expanded and fluid-filled with flattening of  the pituitary along the floor of the sella. Grossly similar findings on head CT  from 11/2015. Visualized Upper Cervical Spine: No Chiari malformation. No upper cervical  spinal canal stenosis. Orbits: Normal for non-dedicated exam.  Paranasal Sinuses: No significant paranasal sinus disease. Right maxillary sinus  retention cyst.  Mastoid Air Cells:  Clear.     IMPRESSION  IMPRESSION:     No acute intracranial abnormality. No mass, hemorrhage, or acute infarct.     Mild to moderate burden of nonspecific T2 FLAIR hyperintense cerebral white  matter signal changes. Not suggestive of demyelinating disease, most likely  chronic microvascular ischemic changes. TTE: Interpretation Summary     · Normal cavity size, wall thickness and systolic function (ejection fraction normal). Estimated left ventricular ejection fraction is 55 - 60%. Visually measured ejection fraction. No regional wall motion abnormality noted. Age-appropriate left ventricular diastolic function.   · Agitated saline contrast study was performed and color flow Doppler was used. There was no shunting at baseline or with Valsalva. · Mildly dilated left atrium. · Pulmonary arterial systolic pressure is 23 mmHg. CDUS: Interpretation Summary     Mild homogenous plaque bilateral internal carotid arteries with less than 50% stenosis. Vertebrals are antegrade bilateral.  Subclavian's appear normal bilateral.         CBC:   Lab Results   Component Value Date/Time    WBC 6.5 01/20/2020 04:50 AM    RBC 3.60 (L) 01/20/2020 04:50 AM    HGB 7.7 (L) 01/21/2020 02:46 PM    HCT 27.5 (L) 01/21/2020 02:46 PM    PLATELET 603 85/05/1785 04:50 AM     BMP:   Lab Results   Component Value Date/Time    Glucose 91 01/18/2020 10:05 PM    Sodium 141 01/18/2020 10:05 PM    Potassium 3.5 01/18/2020 10:05 PM    Chloride 112 (H) 01/18/2020 10:05 PM    CO2 22 01/18/2020 10:05 PM    BUN 14 01/18/2020 10:05 PM    Creatinine 0.95 01/18/2020 10:05 PM    Calcium 8.2 (L) 01/18/2020 10:05 PM     CMP:   Lab Results   Component Value Date/Time    Glucose 91 01/18/2020 10:05 PM    Sodium 141 01/18/2020 10:05 PM    Potassium 3.5 01/18/2020 10:05 PM    Chloride 112 (H) 01/18/2020 10:05 PM    CO2 22 01/18/2020 10:05 PM    BUN 14 01/18/2020 10:05 PM    Creatinine 0.95 01/18/2020 10:05 PM    Calcium 8.2 (L) 01/18/2020 10:05 PM    Anion gap 7 01/18/2020 10:05 PM    BUN/Creatinine ratio 15 01/18/2020 10:05 PM    Alk. phosphatase 118 (H) 01/18/2020 10:05 PM    Protein, total 7.9 01/18/2020 10:05 PM    Albumin 3.5 01/18/2020 10:05 PM    Globulin 4.4 (H) 01/18/2020 10:05 PM    A-G Ratio 0.8 01/18/2020 10:05 PM       Assessment:  A 62years old female patient with medical history of fibromyalgia and migraine came to the emergency room for sudden weakness and numbness of the left upper and lower extremities and with left facial droop. In addition she had some blurring of vision mainly on the right side. She is improving currently.   MRI of the brain is negative for an acute stroke. She had a transthoracic echo which is negative. Less likely to be stroke or TIA. CDUS unremarkable. Patient has severe anemia with a hemoglobin of 7 and got an iron infusion. Will sign off. Sincerely,      Eleni Kunz M.D. PLEASE NOTE:   This document has been produced using voice recognition software. Unrecognized errors in transcription may be present.

## 2020-01-21 NOTE — PROGRESS NOTES
Reason for Admission:  Weakness of left upper extremity [R29.898]  Nausea and vomiting [R11.2]  UTI (urinary tract infection) [N39.0]                 RRAT Score:    5            Plan for utilizing home health:    No                      Likelihood of Readmission:   LOW                         Transition of Care Plan:              Initial assessment completed with patient. Cognitive status of patient: oriented to time, place, person and situation. Face sheet information confirmed:  yes. The patient designates her daughter Doron Malcolm [phone]  to participate in her discharge plan and to receive any needed information. This patient lives in a single family home with patient's fiance. Patient is able to navigate steps as needed. Prior to hospitalization, patient was considered to be independent with ADLs/IADLS : yes . Patient has a current ACP document on file: no  The patient's  daughter will be available to transport patient home upon discharge. The patient already has none reported,  medical equipment available in the home. Patient is not currently active with home health. Patient has not stayed in a skilled nursing facility or rehab. This patient is on dialysis :no      Currently, the discharge plan is Home. The patient states that she can obtain her medications from the pharmacy, and take her medications as directed. Patient's current insurance is [employer]. Care Management Interventions  PCP Verified by CM: Yes(Patient uses [facility] on [address], and could not name one specific doctor that she sees at this practice.)  Mode of Transport at Discharge: Self  Transition of Care Consult (CM Consult): Discharge Planning  Discharge Durable Medical Equipment: No  Physical Therapy Consult: Yes  Occupational Therapy Consult: Yes  Speech Therapy Consult: Yes  Current Support Network:  Other(Patient lives with her fiance.)  Confirm Follow Up Transport: Family  Discharge Location  Discharge Placement:  Gabriela Chou RN  Case Management [phone]

## 2020-01-21 NOTE — ROUTINE PROCESS
Bedside and Verbal shift change report given to [employer], RN (oncoming nurse) by Mitchell Meza RN 
 (offgoing nurse). Report included the following information SBAR, Kardex, Intake/Output, MAR and Recent Results. yes

## 2020-01-21 NOTE — DISCHARGE INSTRUCTIONS
Patient armband removed and shredded    MyChart Activation    Thank you for requesting access to [employer]. Please follow the instructions below to securely access and download your online medical record. [employer] allows you to send messages to your doctor, view your test results, renew your prescriptions, schedule appointments, and more. How Do I Sign Up? 1. In your internet browser, go to [email or web address]  2. Click on the First Time User? Click Here link in the Sign In box. You will be redirect to the New Member Sign Up page. 3. Enter your [employer] Access Code exactly as it appears below. You will not need to use this code after youve completed the sign-up process. If you do not sign up before the expiration date, you must request a new code. [employer] Access Code: [email or web address]  Expires: 2020 11:41 AM (This is the date your [employer] access code will )    4. Enter the last four digits of your Social Security Number (xxxx) and Date of Birth (mm/dd/yyyy) as indicated and click Submit. You will be taken to the next sign-up page. 5. Create a [employer] ID. This will be your [employer] login ID and cannot be changed, so think of one that is secure and easy to remember. 6. Create a [employer] password. You can change your password at any time. 7. Enter your Password Reset Question and Answer. This can be used at a later time if you forget your password. 8. Enter your e-mail address. You will receive e-mail notification when new information is available in [address] 9. Click Sign Up. You can now view and download portions of your medical record. 10. Click the Download Summary menu link to download a portable copy of your medical information. Additional Information    If you have questions, please visit the Frequently Asked Questions section of the [employer] website at [email or web address] Remember, [employer] is NOT to be used for urgent needs.  For medical emergencies, dial 69 Gabriela Leone from Nurse    PATIENT INSTRUCTIONS:    After general anesthesia or intravenous sedation, for 24 hours or while taking prescription Narcotics:  · Limit your activities  · Do not drive and operate hazardous machinery  · Do not make important personal or business decisions  · Do  not drink alcoholic beverages  · If you have not urinated within 8 hours after discharge, please contact your surgeon on call. Report the following to your surgeon:  · Excessive pain, swelling, redness or odor of or around the surgical area  · Temperature over 100.5  · Nausea and vomiting lasting longer than 4 hours or if unable to take medications  · Any signs of decreased circulation or nerve impairment to extremity: change in color, persistent  numbness, tingling, coldness or increase pain  · Any questions    What to do at Home:  Recommended activity: Activity as tolerated,     If you experience any of the following symptoms loss of balance, blurred vision, facial droop, slurred speech, arm numbness or weakness, please follow up with 911. *  Please give a list of your current medications to your Primary Care Provider. *  Please update this list whenever your medications are discontinued, doses are      changed, or new medications (including over-the-counter products) are added. *  Please carry medication information at all times in case of emergency situations. These are general instructions for a healthy lifestyle:    No smoking/ No tobacco products/ Avoid exposure to second hand smoke  Surgeon General's Warning:  Quitting smoking now greatly reduces serious risk to your health.     Obesity, smoking, and sedentary lifestyle greatly increases your risk for illness    A healthy diet, regular physical exercise & weight monitoring are important for maintaining a healthy lifestyle    You may be retaining fluid if you have a history of heart failure or if you experience any of the following symptoms: Weight gain of 3 pounds or more overnight or 5 pounds in a week, increased swelling in our hands or feet or shortness of breath while lying flat in bed. Please call your doctor as soon as you notice any of these symptoms; do not wait until your next office visit. The discharge information has been reviewed with the patient. The patient verbalized understanding. Discharge medications reviewed with the patient and appropriate educational materials and side effects teaching were provided.   ___________________________________________________________________________________________________________________________________

## 2020-01-22 NOTE — DISCHARGE SUMMARY
[facility]  Discharge Summary       Patient: David Lee Age: 62 y.o. : 1965 MR#: [id number] SSN: [id number]  PCP on record: Enriqueta Leal MD  Admit date: 2020  Discharge date: 2020    Consults: Dr Nieves Hernandez., -neurology    -   Procedures: none  -     Significant Diagnostic Studies: -Head CT 20  IMPRESSION:  1. No acute intracranial process identified. If continued clinical concern for  acute ischemia, consider MR for further evaluation.     2.  Mild multifocal chronic small vessel ischemic changes.      -MRI brain 20:  No acute intracranial abnormality. No mass, hemorrhage, or acute infarct.     Mild to moderate burden of nonspecific T2 FLAIR hyperintense cerebral white  matter signal changes. Not suggestive of demyelinating disease, most likely  chronic microvascular ischemic changes. -Duplex carotid  20:  Right Carotid     The right CCA is patent. There is mild stenosis in the right ICA (<50%). The right ICA has mild and homogeneous plaque. The right ECA is patent. The right vertebral is antegrade. The right subclavian is normal.   Left Carotid     The left CCA is patent. There is mild stenosis in the left ICA (<50%). The left ICA has mild and homogeneous plaque. The left ECA is patent. The left vertebral is antegrade. The left subclavian is normal.     -Duplex lower extremities 20: Interpretation Summary     No evidence of DVT within bilateral lower extremities           Discharge Diagnoses:-Left sided weakness, facial droop, unexplained at this time. Stroke w/u negative.   -Microcytic anemia likely due to nutritional deficiencies due to gastric bypass surgery and non complaince w/ vitamin and mineral supplement  -Iron deficiency                                              Patient Active Problem List   Diagnosis Code    UTI (urinary tract infection) N39.0    Nausea and vomiting R11.2    Weakness of left upper extremity R29.898    TIA (transient ischemic attack) G45.9    S/P gastric bypass Z98.84    Recurrent pyelonephritis N12    Positive blood culture R78.81    Microcytic anemia D50.9    Fibromyalgia M79.7    Acute coronary syndrome (HCC) I24.9    Nocturnal leg cramps G47.62       Hospital Course by Problem   Pt is a 62 female with history of fibromyalgia , gastric bypass and noncompliance with medications who was admitted after presenting withchief complaint of confusion and left arm and weakness as well as left facial numbness and difficulty focusing and expressing her ideas and speech. She was admitted for further management and work-up. Patient was evaluated by the neurology team as part of her work-up for her presentation. She has had MRI of the brain which did not reveal clear cause for her presentation. She, subsequently has been seen again by the neurologist who does not feel that his her presentation is secondary to TIA or any type of ischemic event. No further work-up or recommendations made. She did have cardiac echo and carotid ultrasound as part of her work-up which did not show clear risk of stroke or possibility of strokes. She was noted to have microcytic anemia w/ hgb that went as low as 7 but on repeat went back up to 7.7 w/o transfusion. Pt has had gastric bypass surgery and her anemia is likely due to nutritional deficiencies including iron and B12( She has low nl B12 level). She states she is supposed to be on B12 IM shots but has not been getting this vitamin. She received iron IV this admission and is to get B12 IM before d/c. Pt reports that in the past, the way she has been dealing w/ her deficiencies and anemia is to get PRBC transfusion and IV iron regularly. She denies having SOB, fatigue, weakness.  Since her most recent hgb is higher than previous level, plan is to d/c pt and have her f/u w/ pcp and specialists, including hematologist for cont'd work up and management of her anemia. On date of d/c, pt had c/o migraine but otw was noted to be stable overall clinically. Plan is to dc pt w/ outpt home health, home PT/OT, and with strong recommendations to follow up with Cardiology eval.            Today's examination of the patient revealed:     Subjective:     Objective:   VS:   Visit Vitals  /74 (BP 1 Location: Right arm, BP Patient Position: Supine)   Pulse 81   Temp 98.1 °F (36.7 °C)   Resp 18   Ht 4' 11\" (1.499 m)   Wt 75.3 kg (166 lb)   SpO2 97%   Breastfeeding No   BMI 33.53 kg/m²      Tmax/24hrs: Temp (24hrs), Av.9 °F (36.6 °C), Min:97.6 °F (36.4 °C), Max:98.1 °F (36.7 °C)     Input/Output:     Intake/Output Summary (Last 24 hours) at 2020 1520  Last data filed at 2020 1202  Gross per 24 hour   Intake 1170 ml   Output    Net 1170 ml       General:  Alert, awake, in NAD  Cardiovascular: RRR, no murmurs    Pulmonary:  ctab  GI:  Soft nt, ne,   Extremities:  NO rosette  Additional:      Labs:    Recent Results (from the past 24 hour(s))   GLUCOSE, POC    Collection Time: 20  9:52 PM   Result Value Ref Range    Glucose (POC) 104 70 - 110 mg/dL   GLUCOSE, POC    Collection Time: 20  7:44 AM   Result Value Ref Range    Glucose (POC) 93 70 - 110 mg/dL   DUPLEX CAROTID BILATERAL    Collection Time: 20  9:23 AM   Result Value Ref Range    Right cca dist sys 72.7 cm/s    Right CCA dist godfrey 27.3 cm/s    RIGHT COMMON CAROTID ARTERY MID S 102.52 cm/s    RIGHT COMMON CAROTID ARTERY MID D 29.48 cm/s    Right CCA prox sys 111.7 cm/s    Right CCA prox godfrey 26.9 cm/s    Right ICA dist sys 136.2 cm/s    Right ICA dist gofdrey 60.3 cm/s    Right ICA mid sys 114.1 cm/s    Right ICA mid godfrey 43.0 cm/s    Right ICA prox sys 83.1 cm/s    Right ICA prox godfrey 30.8 cm/s    Right vertebral sys 61.1 cm/s    RIGHT VERTEBRAL ARTERY D 17.08 cm/s    Right ICA/CCA sys 1.9     Left CCA dist sys 84.7 cm/s    Left CCA dist godfrey 31.2 cm/s    LEFT COMMON CAROTID ARTERY MID S 75.52 cm/s    LEFT COMMON CAROTID ARTERY MID D 23.35 cm/s    Left CCA prox sys 87.3 cm/s    Left CCA prox godfrey 27.3 cm/s    Left ICA dist sys 61.1 cm/s    Left ICA dist godfrey 26.3 cm/s    Left ICA mid sys 106.4 cm/s    Left ICA mid godfrey 44.5 cm/s    Left ICA prox sys 83.8 cm/s    Left ICA prox godfrey 24.4 cm/s    Left vertebral sys 51.7 cm/s    LEFT VERTEBRAL ARTERY D 17.51 cm/s    Left ICA/CCA sys 1.26     Right subclavian sys 67.7 cm/s    Left ECA sys 55.9 cm/s    Left subclavian sys 79.4 cm/s    LEFT SUBCLAVIAN ARTERY D 5.1 cm/s    LEFT EXTERNAL CAROTID ARTERY D 8.0 cm/s    RIGHT SUBCLAVIAN ARTERY D 0.0 cm/s   HGB & HCT    Collection Time: 01/21/20  2:46 PM   Result Value Ref Range    HGB 7.7 (L) 12.0 - 16.0 g/dL    HCT 27.5 (L) 35.0 - 45.0 %     Additional Data Reviewed:     Condition on d/c :stable  Disposition:    [x]Home   []Home with Home Health   []SNF/NH   []Rehab   []Home with family   []Alternate Facility:____________________      Discharge Medications:     Current Discharge Medication List      START taking these medications    Details   aspirin 81 mg chewable tablet Take 1 Tab by mouth daily. Qty: 30 Tab, Refills: 0      ferrous sulfate 325 mg (65 mg iron) tablet Take 1 Tab by mouth Daily (before breakfast). Qty: 30 Tab, Refills: 0         CONTINUE these medications which have NOT CHANGED    Details   nortriptyline (PAMELOR) 25 mg capsule Take 50 mg by mouth nightly. Comp. Stocking,Knee,Regular,Lrg misc 20-30 mm hg  Qty: 2 Package, Refills: 1    Associated Diagnoses: Lower extremity edema      albuterol (PROVENTIL HFA) 90 mcg/actuation inhaler Take 2 Puffs by inhalation. STOP taking these medications       naproxen sodium (ALEVE) 220 mg cap Comments:   Reason for Stopping: Follow-up Appointments:   1. Your PCP: Enriqueta Leal MD, within 7-10days  2.  Follow up owiT     >30 minutes spent coordinating this discharge (review instructions/follow-up, prescriptions, preparing report for sign off)    Signed:  Batsheva Segura MD  1/21/2020  3:20 PM

## 2020-03-05 ENCOUNTER — HOSPITAL ENCOUNTER (OUTPATIENT)
Dept: GENERAL RADIOLOGY | Age: 55
Discharge: HOME | End: 2020-03-05
Payer: COMMERCIAL

## 2020-03-05 DIAGNOSIS — M77.30 CALCANEAL SPUR: ICD-10-CM

## 2020-03-05 PROCEDURE — 73650 X-RAY EXAM OF HEEL: CPT

## 2020-03-24 ENCOUNTER — OFFICE VISIT (OUTPATIENT)
Dept: FAMILY MEDICINE CLINIC | Age: 55
End: 2020-03-24

## 2020-03-24 VITALS
TEMPERATURE: 97.9 F | WEIGHT: 164 LBS | HEIGHT: 59 IN | HEART RATE: 64 BPM | SYSTOLIC BLOOD PRESSURE: 129 MMHG | BODY MASS INDEX: 33.06 KG/M2 | RESPIRATION RATE: 18 BRPM | DIASTOLIC BLOOD PRESSURE: 64 MMHG

## 2020-03-24 DIAGNOSIS — M79.7 FIBROMYALGIA: Primary | ICD-10-CM

## 2020-03-24 DIAGNOSIS — R25.2 MUSCLE CRAMP: ICD-10-CM

## 2020-03-24 RX ORDER — METHOCARBAMOL 500 MG/1
500 TABLET, FILM COATED ORAL 4 TIMES DAILY
Qty: 90 TAB | Refills: 0 | Status: SHIPPED | OUTPATIENT
Start: 2020-03-24

## 2020-03-24 RX ORDER — DICLOFENAC SODIUM 10 MG/G
GEL TOPICAL 4 TIMES DAILY
Qty: 2 G | Refills: 1 | Status: SHIPPED | OUTPATIENT
Start: 2020-03-24

## 2020-03-24 NOTE — PROGRESS NOTES
Jaylen Cosme is a 62 y.o. female presenting today for Follow-up (Fibromyalgia) and Leg Pain    HPI:  Jaylen Cosme presents to the office today for fibromyalgia and lower extremity pain. Patient has a PMH for asthma, arthritis, migraines, anemia, cervical neck pain with evidence of disc disease and fibromyalgia. She presents today complaining of lower extremity myalgia. She also has intermitten upper extremity myalgia. She notes she recently quit her job secondary to the prolong hours and the increase pain with standing. Notes she has muscle cramps and can even go the the [employer] without using a riding chair. She denies any known injury and describes pain and muscle cramps in her lower extremities. . She reports she has generalized body discomfort most days. She reports, \" I am tired of hurting\". She notes increased pain at night when she is lying down. She has a history of fibromyalgia but does not oumou any medications for her symptoms. Review of Systems   Cardiovascular: Negative for chest pain, palpitations and leg swelling. Musculoskeletal: Positive for myalgias. Allergies   Allergen Reactions    Imitrex [Sumatriptan Succinate] Other (comments)     \"burned my skin\"    Penicillins Hives    Prednisolone Swelling       Current Outpatient Medications   Medication Sig Dispense Refill    diclofenac (VOLTAREN) 1 % gel Apply  to affected area four (4) times daily. 2 g 1    methocarbamoL (ROBAXIN) 500 mg tablet Take 1 Tab by mouth four (4) times daily. 90 Tab 0    aspirin 81 mg chewable tablet Take 1 Tab by mouth daily. 30 Tab 0    ferrous sulfate 325 mg (65 mg iron) tablet Take 1 Tab by mouth Daily (before breakfast). 30 Tab 0    cyanocobalamin (VITAMIN B-12) 1,000 mcg/mL injection 1 mL by IntraMUSCular route daily. Vitamin B 12 daily for seven days then every 3 days days for three weeks. 1 Vial 0    Comp. Stocking,Knee,Regular,Lrg misc 20-30 mm hg 2 Package 1    nortriptyline (PAMELOR) 25 mg capsule Take 50 mg by mouth nightly.  albuterol (PROVENTIL HFA) 90 mcg/actuation inhaler Take 2 Puffs by inhalation.          Past Medical History:   Diagnosis Date    Anemia     Arthritis     Asthma     Cervical neck pain with evidence of disc disease     Fibromyalgia     Migraines     Nocturnal leg cramps 2020    TIA (transient ischemic attack) 2020       Past Surgical History:   Procedure Laterality Date    ABDOMEN SURGERY PROC UNLISTED      HX CHOLECYSTECTOMY      HX GASTRIC BYPASS      HX GYN      HX HYSTERECTOMY      HX ORTHOPAEDIC      L arm has steel bar        Social History     Socioeconomic History    Marital status: UNKNOWN     Spouse name: Not on file    Number of children: Not on file    Years of education: Not on file    Highest education level: Not on file   Occupational History    Occupation:  [employer]   Social Needs    Financial resource strain: Not on file    Food insecurity     Worry: Not on file     Inability: Not on file    Transportation needs     Medical: Not on file     Non-medical: Not on file   Tobacco Use    Smoking status: Former Smoker     Packs/day: 0.50     Last attempt to quit: 2015     Years since quittin.4    Smokeless tobacco: Never Used   Substance and Sexual Activity    Alcohol use: No    Drug use: No    Sexual activity: Not Currently   Lifestyle    Physical activity     Days per week: Not on file     Minutes per session: Not on file    Stress: Not on file   Relationships    Social connections     Talks on phone: Not on file     Gets together: Not on file     Attends Taoist service: Not on file     Active member of club or organization: Not on file     Attends meetings of clubs or organizations: Not on file     Relationship status: Not on file    Intimate partner violence     Fear of current or ex partner: Not on file     Emotionally abused: Not on file     Physically abused: Not on file     Forced sexual activity: Not on file   Other Topics Concern    Not on file   Social History Narrative    Children in [place], moved to [place] 2019; lives with male partner       Patient does have an advanced directive on file    Vitals:    03/24/20 1419   BP: 129/64   Pulse: 64   Resp: 18   Temp: 97.9 °F (36.6 °C)   TempSrc: Oral   Weight: 164 lb (74.4 kg)   Height: 4' 11\" (1.499 m)   PainSc:   9   PainLoc: Leg       Physical Exam  Cardiovascular:      Rate and Rhythm: Normal rate and regular rhythm. Pulses: Normal pulses. Heart sounds: Normal heart sounds. Pulmonary:      Effort: Pulmonary effort is normal.      Breath sounds: Normal breath sounds. Musculoskeletal:         General: Tenderness present. No deformity or signs of injury. Admission on 01/18/2020, Discharged on 01/21/2020   Component Date Value Ref Range Status    WBC 01/18/2020 9.9  4.6 - 13.2 K/uL Final    RBC 01/18/2020 4.21  4.20 - 5.30 M/uL Final    HGB 01/18/2020 8.0* 12.0 - 16.0 g/dL Final    HCT 01/18/2020 28.4* 35.0 - 45.0 % Final    MCV 01/18/2020 67.5* 74.0 - 97.0 FL Final    MCH 01/18/2020 19.0* 24.0 - 34.0 PG Final    MCHC 01/18/2020 28.2* 31.0 - 37.0 g/dL Final    RDW 01/18/2020 17.4* 11.6 - 14.5 % Final    PLATELET 55/48/1461 199* 135 - 420 K/uL Final    MPV 01/18/2020 9.5  9.2 - 11.8 FL Final    NEUTROPHILS 01/18/2020 73  40 - 73 % Final    LYMPHOCYTES 01/18/2020 20* 21 - 52 % Final    MONOCYTES 01/18/2020 6  3 - 10 % Final    EOSINOPHILS 01/18/2020 1  0 - 5 % Final    BASOPHILS 01/18/2020 0  0 - 2 % Final    ABS. NEUTROPHILS 01/18/2020 7.1  1.8 - 8.0 K/UL Final    ABS. LYMPHOCYTES 01/18/2020 2.0  0.9 - 3.6 K/UL Final    ABS. MONOCYTES 01/18/2020 0.6  0.05 - 1.2 K/UL Final    ABS. EOSINOPHILS 01/18/2020 0.1  0.0 - 0.4 K/UL Final    ABS.  BASOPHILS 01/18/2020 0.0  0.0 - 0.1 K/UL Final    DF 01/18/2020 AUTOMATED    Final    BENZODIAZEPINES 01/18/2020 NEGATIVE   NEG   Final    BARBITURATES 01/18/2020 NEGATIVE   NEG   Final    THC (TH-CANNABINOL) 01/18/2020 NEGATIVE   NEG   Final    OPIATES 01/18/2020 NEGATIVE   NEG   Final    PCP(PHENCYCLIDINE) 01/18/2020 NEGATIVE   NEG   Final    COCAINE 01/18/2020 NEGATIVE   NEG   Final    AMPHETAMINES 01/18/2020 NEGATIVE   NEG   Final    METHADONE 01/18/2020 NEGATIVE   NEG   Final    HDSCOM 01/18/2020 (NOTE)   Final    Comment: Specimen analysis was performed without chain of custody handling. These results should be used for medical purposes only and not for   legal or employment purposes. Unconfirmed screening results must not   be used for non-medical purposes. The cut-off concentration for positive results are as follows:    AMPH     1000 ng/mL  TRINI      200 ng/mL  JIMI      200 ng/mL  ENIO       300 ng/mL  METH      300 ng/mL  OPI       300 ng/mL  PCP        25 ng/mL  THC        50 ng/mL        CK 01/18/2020 118  26 - 192 U/L Final    CK - MB 01/18/2020 1.4  <3.6 ng/ml Final    CK-MB Index 01/18/2020 1.2  0.0 - 4.0 % Final    Troponin-I, QT 01/18/2020 <0.02  0.0 - 0.045 NG/ML Final    Comment: The presence of detectable troponin above the reference range indicates myocardial injury which may be due to ischemia, myocarditis, trauma, etc.  Clinical correlation is necessary to establish the significance of this finding. Sequential testing is recommended to determine if the typical rise and fall of cTnI is demonstrated. Note:  Cardiac troponin I has a relatively long half life and may be present well after the CK MB has returned to baseline. The reference range is based on the 99th percentile of the referent population.       Ventricular Rate 01/18/2020 96  BPM Final    Atrial Rate 01/18/2020 96  BPM Final    P-R Interval 01/18/2020 154  ms Final    QRS Duration 01/18/2020 120  ms Final    Q-T Interval 01/18/2020 376  ms Final    QTC Calculation (Bezet) 01/18/2020 475  ms Final    Calculated P Axis 01/18/2020 57  degrees Final    Calculated R Axis 01/18/2020 26  degrees Final    Calculated T Axis 01/18/2020 17  degrees Final    Diagnosis 01/18/2020    Final                    Value:Normal sinus rhythm  Right bundle branch block  Abnormal ECG  When compared with ECG of 29-OCT-2019 14:57,  Right bundle branch block is now present  Confirmed by Jamar Cantu ([id number]) on 1/19/2020 5:59:47 PM      Sodium 01/18/2020 141  136 - 145 mmol/L Final    Potassium 01/18/2020 3.5  3.5 - 5.5 mmol/L Final    Chloride 01/18/2020 112* 100 - 111 mmol/L Final    CO2 01/18/2020 22  21 - 32 mmol/L Final    Anion gap 01/18/2020 7  3.0 - 18 mmol/L Final    Glucose 01/18/2020 91  74 - 99 mg/dL Final    BUN 01/18/2020 14  7.0 - 18 MG/DL Final    Creatinine 01/18/2020 0.95  0.6 - 1.3 MG/DL Final    BUN/Creatinine ratio 01/18/2020 15  12 - 20   Final    GFR est AA 01/18/2020 >60  >60 ml/min/1.73m2 Final    GFR est non-AA 01/18/2020 >60  >60 ml/min/1.73m2 Final    Comment: (NOTE)  Estimated GFR is calculated using the Modification of Diet in Renal   Disease (MDRD) Study equation, reported for both  Americans   (GFRAA) and non- Americans (GFRNA), and normalized to 1.73m2   body surface area. The physician must decide which value applies to   the patient. The MDRD study equation should only be used in   individuals age 25 or older. It has not been validated for the   following: pregnant women, patients with serious comorbid conditions,   or on certain medications, or persons with extremes of body size,   muscle mass, or nutritional status.  Calcium 01/18/2020 8.2* 8.5 - 10.1 MG/DL Final    Bilirubin, total 01/18/2020 0.2  0.2 - 1.0 MG/DL Final    ALT (SGPT) 01/18/2020 27  13 - 56 U/L Final    AST (SGOT) 01/18/2020 25  10 - 38 U/L Final    Alk.  phosphatase 01/18/2020 118* 45 - 117 U/L Final    Protein, total 01/18/2020 7.9  6.4 - 8.2 g/dL Final    Albumin 01/18/2020 3.5  3.4 - 5.0 g/dL Final    Globulin 01/18/2020 4.4* 2.0 - 4.0 g/dL Final    A-G Ratio 01/18/2020 0.8  0.8 - 1.7   Final    Prothrombin time 01/18/2020 12.5  11.5 - 15.2 sec Final    INR 01/18/2020 1.0  0.8 - 1.2   Final    Comment:            INR Therapeutic Ranges         (on stable oral anticoagulant):     INDICATION                INR  DVT/PE/Atrial Fib          2.0-3.0  MI/Mechanical Heart Valve  2.5-3.5      Color 01/18/2020 YELLOW    Final    Appearance 01/18/2020 CLEAR    Final    Specific gravity 01/18/2020 1.015  1.005 - 1.030   Final    pH (UA) 01/18/2020 5.5  5.0 - 8.0   Final    Protein 01/18/2020 NEGATIVE   NEG mg/dL Final    Glucose 01/18/2020 NEGATIVE   NEG mg/dL Final    Ketone 01/18/2020 NEGATIVE   NEG mg/dL Final    Bilirubin 01/18/2020 NEGATIVE   NEG   Final    Blood 01/18/2020 TRACE* NEG   Final    Urobilinogen 01/18/2020 1.0  0.2 - 1.0 EU/dL Final    Nitrites 01/18/2020 POSITIVE* NEG   Final    Leukocyte Esterase 01/18/2020 MODERATE* NEG   Final    WBC 01/18/2020 11 to 20  0 - 5 /hpf Final    RBC 01/18/2020 0 to 1  0 - 5 /hpf Final    Epithelial cells 01/18/2020 1+  0 - 5 /lpf Final    Bacteria 01/18/2020 3+* NEG /hpf Final    LA Volume 01/20/2020 65.36  22 - 52 mL Final    LV E' Lateral Velocity 01/20/2020 10.38  cm/s Final    LV E' Septal Velocity 01/20/2020 10.75  cm/s Final    Tapse 01/20/2020 2.72* 1.5 - 2.0 cm Final    Ao Root D 01/20/2020 2.87  cm Final    LVIDd 01/20/2020 5.21  3.9 - 5.3 cm Final    LVPWd 01/20/2020 0.83  0.6 - 0.9 cm Final    LVIDs 01/20/2020 3.27  cm Final    IVSd 01/20/2020 0.81  0.6 - 0.9 cm Final    LVOT d 01/20/2020 2.02  cm Final    LVOT Peak Velocity 01/20/2020 103.52  cm/s Final    LVOT Peak Gradient 01/20/2020 4.3  mmHg Final    LVOT VTI 01/20/2020 20.58  cm Final    MV A Matthieu 01/20/2020 77.52  cm/s Final    MV E Matthieu 01/20/2020 81.58  cm/s Final    MV E/A 01/20/2020 1.05   Final    LA Vol 4C 01/20/2020 69.41* 22 - 52 mL Final    LA Vol 2C 01/20/2020 49.04  22 - 52 mL Final    LA Area 4C 01/20/2020 22.7  cm2 Final    LV Mass AL 01/20/2020 172.6* 67 - 162 g Final    LV Mass AL Index 01/20/2020 101.3  43 - 95 g/m2 Final    E/E' lateral 01/20/2020 7.86   Final    E/E' septal 01/20/2020 7.59   Final    E/E' ratio (averaged) 01/20/2020 7.72   Final    Mitral Valve E Wave Deceleration T* 01/20/2020 198.8  ms Final    Triscuspid Valve Regurgitation Pea* 01/20/2020 19.6  mmHg Final    TR Max Velocity 01/20/2020 221.11  cm/s Final    LA Vol Index 01/20/2020 38.37  16 - 28 ml/m2 Final    LA Vol Index 01/20/2020 28.79  16 - 28 ml/m2 Final    LA Vol Index 01/20/2020 40.74  16 - 28 ml/m2 Final    Left Ventricular Fractional Shorte* 01/20/2020 65.520504635  % Final    Left Ventricular Outflow Tract Dia* 01/20/2020 1.788099217889  mmHg Final    Left Ventricular Outflow Tract Dia* 01/20/2020 1.25248956542321  cm/s Final    Mitral Valve Deceleration Real 01/20/2020 4.825350563059   Final    Left Ventricular End Diastolic Vol* 51/49/5962 2.25590870475  mL Final    Left Ventricular End Systolic Volu* 54/14/0668 6.38539954858  mL Final    Left Ventricular Stroke Volume by * 01/20/2020 60.822752038  mL Final    LIPID PROFILE 01/20/2020        Final    Cholesterol, total 01/20/2020 117  <200 MG/DL Final    Triglyceride 01/20/2020 78  <150 MG/DL Final    Comment: The drugs N-acetylcysteine (NAC) and  Metamiszole have been found to cause falsely  low results in this chemical assay. Please  be sure to submit blood samples obtained  BEFORE administration of either of these  drugs to assure correct results.       HDL Cholesterol 01/20/2020 31* 40 - 60 MG/DL Final    LDL, calculated 01/20/2020 70.4  0 - 100 MG/DL Final    VLDL, calculated 01/20/2020 15.6  MG/DL Final    CHOL/HDL Ratio 01/20/2020 3.8  0 - 5.0   Final    Hemoglobin A1c 01/20/2020 <3.5* 4.2 - 5.6 % Final    Comment: (NOTE)  HbA1C Interpretive Ranges  <5.7              Normal  5.7 - 6.4         Consider Prediabetes  >6.5 Consider Diabetes      Est. average glucose 01/20/2020 Cannot be calculated  mg/dL Final    Estimated average glucose results are not reported when the hemoglobin A1c is <5% or >15% since it has not been validated for values in these ranges.  WBC 01/20/2020 6.5  4.6 - 13.2 K/uL Final    RBC 01/20/2020 3.60* 4.20 - 5.30 M/uL Final    HGB 01/20/2020 7.0* 12.0 - 16.0 g/dL Final    HCT 01/20/2020 24.1* 35.0 - 45.0 % Final    MCV 01/20/2020 66.9* 74.0 - 97.0 FL Final    MCH 01/20/2020 19.4* 24.0 - 34.0 PG Final    MCHC 01/20/2020 29.0* 31.0 - 37.0 g/dL Final    RDW 01/20/2020 17.2* 11.6 - 14.5 % Final    PLATELET 75/46/3743 605  135 - 420 K/uL Final    MPV 01/20/2020 9.7  9.2 - 11.8 FL Final    Iron 01/20/2020 14* 50 - 175 ug/dL Final    Patients receiving metal-binding drugs (e.g. deferoxamine) may show spuriously depressed iron values, as chelated iron may not properly react in the iron assay.  TIBC 01/20/2020 432  250 - 450 ug/dL Final    Iron % saturation 01/20/2020 3  % Final    Vitamin B12 01/20/2020 324  211 - 911 pg/mL Final    Folate 01/20/2020 11.2  3.10 - 17.50 ng/mL Final    CK 01/20/2020 58  26 - 192 U/L Final    CK - MB 01/20/2020 <1.0  <3.6 ng/ml Final    CK-MB Index 01/20/2020 CALCULATION NOT PERFORMED WHEN RESULT IS BELOW LINEAR LIMIT  0.0 - 4.0 % Final    Troponin-I, QT 01/20/2020 <0.02  0.0 - 0.045 NG/ML Final    Comment: The presence of detectable troponin above the reference range indicates myocardial injury which may be due to ischemia, myocarditis, trauma, etc.  Clinical correlation is necessary to establish the significance of this finding. Sequential testing is recommended to determine if the typical rise and fall of cTnI is demonstrated. Note:  Cardiac troponin I has a relatively long half life and may be present well after the CK MB has returned to baseline. The reference range is based on the 99th percentile of the referent population.       Iron 01/20/2020 14* 50 - 175 ug/dL Final    Patients receiving metal-binding drugs (e.g. deferoxamine) may show spuriously depressed iron values, as chelated iron may not properly react in the iron assay.     TIBC 01/20/2020 427  250 - 450 ug/dL Final    Iron % saturation 01/20/2020 3  % Final    Vitamin B12 01/20/2020 312  211 - 911 pg/mL Final    Folate 01/20/2020 15.0  3.10 - 17.50 ng/mL Final    Right cca dist sys 01/21/2020 72.7  cm/s Final    Right CCA dist godfrey 01/21/2020 27.3  cm/s Final    RIGHT COMMON CAROTID ARTERY MID S 01/21/2020 102.52  cm/s Final    RIGHT COMMON CAROTID ARTERY MID D 01/21/2020 29.48  cm/s Final    Right CCA prox sys 01/21/2020 111.7  cm/s Final    Right CCA prox godfrey 01/21/2020 26.9  cm/s Final    Right ICA dist sys 01/21/2020 136.2  cm/s Final    Right ICA dist godfrey 01/21/2020 60.3  cm/s Final    Right ICA mid sys 01/21/2020 114.1  cm/s Final    Right ICA mid godfrey 01/21/2020 43.0  cm/s Final    Right ICA prox sys 01/21/2020 83.1  cm/s Final    Right ICA prox godfrey 01/21/2020 30.8  cm/s Final    Right vertebral sys 01/21/2020 61.1  cm/s Final    RIGHT VERTEBRAL ARTERY D 01/21/2020 17.08  cm/s Final    Right ICA/CCA sys 01/21/2020 1.9   Final    Left CCA dist sys 01/21/2020 84.7  cm/s Final    Left CCA dist godfrey 01/21/2020 31.2  cm/s Final    LEFT COMMON CAROTID ARTERY MID S 01/21/2020 75.52  cm/s Final    LEFT COMMON CAROTID ARTERY MID D 01/21/2020 23.35  cm/s Final    Left CCA prox sys 01/21/2020 87.3  cm/s Final    Left CCA prox godfrey 01/21/2020 27.3  cm/s Final    Left ICA dist sys 01/21/2020 61.1  cm/s Final    Left ICA dist godfrey 01/21/2020 26.3  cm/s Final    Left ICA mid sys 01/21/2020 106.4  cm/s Final    Left ICA mid godfrey 01/21/2020 44.5  cm/s Final    Left ICA prox sys 01/21/2020 83.8  cm/s Final    Left ICA prox godfrey 01/21/2020 24.4  cm/s Final    Left vertebral sys 01/21/2020 51.7  cm/s Final    LEFT VERTEBRAL ARTERY D 01/21/2020 17.51  cm/s Final    Left ICA/CCA sys 01/21/2020 1.26   Final    Right subclavian sys 01/21/2020 67.7  cm/s Final    Left ECA sys 01/21/2020 55.9  cm/s Final    Left subclavian sys 01/21/2020 79.4  cm/s Final    LEFT SUBCLAVIAN ARTERY D 01/21/2020 5.1  cm/s Final    LEFT EXTERNAL CAROTID ARTERY D 01/21/2020 8.0  cm/s Final    RIGHT SUBCLAVIAN ARTERY D 01/21/2020 0.0  cm/s Final    Glucose (POC) 01/20/2020 104  70 - 110 mg/dL Final    Comment: (NOTE)  The FDA has indicated that no capillary point of care blood glucose   monitoring systems are approved for use in \"critically ill\" patients,   however they have not defined this population. The College of   American Pathologists has recommended that these devices should not   be used in cases such as severe hypotension, dehydration, shock, and   hyperglycemic-hyperosmolar state, amongst others. Venous or arterial   collection is the recommended specimen for testing these patients.  Glucose (POC) 01/21/2020 93  70 - 110 mg/dL Final    Comment: (NOTE)  The FDA has indicated that no capillary point of care blood glucose   monitoring systems are approved for use in \"critically ill\" patients,   however they have not defined this population. The College of   American Pathologists has recommended that these devices should not   be used in cases such as severe hypotension, dehydration, shock, and   hyperglycemic-hyperosmolar state, amongst others. Venous or arterial   collection is the recommended specimen for testing these patients.  HGB 01/21/2020 7.7* 12.0 - 16.0 g/dL Final    HCT 01/21/2020 27.5* 35.0 - 45.0 % Final       .No results found for any visits on 03/24/20. Assessment / Plan:      ICD-10-CM ICD-9-CM    1. Fibromyalgia M79.7 729.1 REFERRAL TO RHEUMATOLOGY      diclofenac (VOLTAREN) 1 % gel   2.  Muscle cramp R25.2 729.82 methocarbamoL (ROBAXIN) 500 mg tablet     Referral to Rheumatology  Voltaren rx given  Robaxin rx gien  F.u pr      Follow-up and Dispositions    · Return in about 3 months (around 6/24/2020), or if symptoms worsen or fail to improve. I asked the patient if she  had any questions and answered her  questions. The patient stated that she understands the treatment plan and agrees with the treatment plan    This document was created with a voice activated dictation system and may contain transcription errors.

## 2020-05-07 ENCOUNTER — HOSPITAL ENCOUNTER (EMERGENCY)
Age: 55
Discharge: HOME | End: 2020-05-07
Attending: EMERGENCY MEDICINE
Payer: COMMERCIAL

## 2020-05-07 ENCOUNTER — APPOINTMENT (OUTPATIENT)
Dept: GENERAL RADIOLOGY | Age: 55
End: 2020-05-07
Attending: STUDENT IN AN ORGANIZED HEALTH CARE EDUCATION/TRAINING PROGRAM
Payer: COMMERCIAL

## 2020-05-07 VITALS
OXYGEN SATURATION: 95 % | SYSTOLIC BLOOD PRESSURE: 117 MMHG | HEART RATE: 116 BPM | DIASTOLIC BLOOD PRESSURE: 66 MMHG | RESPIRATION RATE: 14 BRPM | TEMPERATURE: 98.3 F

## 2020-05-07 DIAGNOSIS — J45.21 MILD INTERMITTENT ASTHMA WITH ACUTE EXACERBATION: Primary | ICD-10-CM

## 2020-05-07 LAB
ALBUMIN SERPL-MCNC: 3.6 G/DL (ref 3.4–5)
ALBUMIN/GLOB SERPL: 0.9 {RATIO} (ref 0.8–1.7)
ALP SERPL-CCNC: 112 U/L (ref 45–117)
ALT SERPL-CCNC: 41 U/L (ref 13–56)
ANION GAP SERPL CALC-SCNC: 10 MMOL/L (ref 3–18)
AST SERPL-CCNC: 36 U/L (ref 10–38)
BASOPHILS # BLD: 0 K/UL (ref 0–0.1)
BASOPHILS NFR BLD: 0 % (ref 0–2)
BILIRUB SERPL-MCNC: 0.4 MG/DL (ref 0.2–1)
BNP SERPL-MCNC: 68 PG/ML (ref 0–900)
BUN SERPL-MCNC: 18 MG/DL (ref 7–18)
BUN/CREAT SERPL: 19 (ref 12–20)
CALCIUM SERPL-MCNC: 8.6 MG/DL (ref 8.5–10.1)
CHLORIDE SERPL-SCNC: 112 MMOL/L (ref 100–111)
CK MB CFR SERPL CALC: 0.9 % (ref 0–4)
CK MB SERPL-MCNC: 1.2 NG/ML (ref 5–25)
CK SERPL-CCNC: 134 U/L (ref 26–192)
CO2 SERPL-SCNC: 20 MMOL/L (ref 21–32)
CREAT SERPL-MCNC: 0.95 MG/DL (ref 0.6–1.3)
DIFFERENTIAL METHOD BLD: ABNORMAL
EOSINOPHIL # BLD: 0.1 K/UL (ref 0–0.4)
EOSINOPHIL NFR BLD: 1 % (ref 0–5)
ERYTHROCYTE [DISTWIDTH] IN BLOOD BY AUTOMATED COUNT: 17.3 % (ref 11.6–14.5)
GLOBULIN SER CALC-MCNC: 4 G/DL (ref 2–4)
GLUCOSE SERPL-MCNC: 124 MG/DL (ref 74–99)
HCT VFR BLD AUTO: 33.3 % (ref 35–45)
HGB BLD-MCNC: 10.1 G/DL (ref 12–16)
LYMPHOCYTES # BLD: 2.5 K/UL (ref 0.9–3.6)
LYMPHOCYTES NFR BLD: 26 % (ref 21–52)
MAGNESIUM SERPL-MCNC: 2.2 MG/DL (ref 1.6–2.6)
MCH RBC QN AUTO: 21.4 PG (ref 24–34)
MCHC RBC AUTO-ENTMCNC: 30.3 G/DL (ref 31–37)
MCV RBC AUTO: 70.4 FL (ref 74–97)
MONOCYTES # BLD: 0.6 K/UL (ref 0.05–1.2)
MONOCYTES NFR BLD: 6 % (ref 3–10)
NEUTS SEG # BLD: 6.4 K/UL (ref 1.8–8)
NEUTS SEG NFR BLD: 67 % (ref 40–73)
PLATELET # BLD AUTO: 414 K/UL (ref 135–420)
PMV BLD AUTO: 10.3 FL (ref 9.2–11.8)
POTASSIUM SERPL-SCNC: 3.2 MMOL/L (ref 3.5–5.5)
PROT SERPL-MCNC: 7.6 G/DL (ref 6.4–8.2)
RBC # BLD AUTO: 4.73 M/UL (ref 4.2–5.3)
SODIUM SERPL-SCNC: 142 MMOL/L (ref 136–145)
TROPONIN I SERPL-MCNC: <0.02 NG/ML (ref 0–0.04)
WBC # BLD AUTO: 9.6 K/UL (ref 4.6–13.2)

## 2020-05-07 PROCEDURE — 93005 ELECTROCARDIOGRAM TRACING: CPT

## 2020-05-07 PROCEDURE — 96365 THER/PROPH/DIAG IV INF INIT: CPT

## 2020-05-07 PROCEDURE — 71045 X-RAY EXAM CHEST 1 VIEW: CPT

## 2020-05-07 PROCEDURE — 80053 COMPREHEN METABOLIC PANEL: CPT

## 2020-05-07 PROCEDURE — 83735 ASSAY OF MAGNESIUM: CPT

## 2020-05-07 PROCEDURE — 82550 ASSAY OF CK (CPK): CPT

## 2020-05-07 PROCEDURE — 85025 COMPLETE CBC W/AUTO DIFF WBC: CPT

## 2020-05-07 PROCEDURE — 99285 EMERGENCY DEPT VISIT HI MDM: CPT

## 2020-05-07 PROCEDURE — 94640 AIRWAY INHALATION TREATMENT: CPT

## 2020-05-07 PROCEDURE — 83880 ASSAY OF NATRIURETIC PEPTIDE: CPT

## 2020-05-07 PROCEDURE — 96366 THER/PROPH/DIAG IV INF ADDON: CPT

## 2020-05-07 PROCEDURE — 74011250636 HC RX REV CODE- 250/636: Performed by: EMERGENCY MEDICINE

## 2020-05-07 PROCEDURE — 74011000250 HC RX REV CODE- 250: Performed by: EMERGENCY MEDICINE

## 2020-05-07 PROCEDURE — 74011250637 HC RX REV CODE- 250/637: Performed by: STUDENT IN AN ORGANIZED HEALTH CARE EDUCATION/TRAINING PROGRAM

## 2020-05-07 PROCEDURE — 96375 TX/PRO/DX INJ NEW DRUG ADDON: CPT

## 2020-05-07 RX ORDER — IPRATROPIUM BROMIDE AND ALBUTEROL SULFATE 2.5; .5 MG/3ML; MG/3ML
3 SOLUTION RESPIRATORY (INHALATION)
Status: COMPLETED | OUTPATIENT
Start: 2020-05-07 | End: 2020-05-07

## 2020-05-07 RX ORDER — IPRATROPIUM BROMIDE AND ALBUTEROL SULFATE 2.5; .5 MG/3ML; MG/3ML
3 SOLUTION RESPIRATORY (INHALATION)
Status: DISCONTINUED | OUTPATIENT
Start: 2020-05-07 | End: 2020-05-07 | Stop reason: CLARIF

## 2020-05-07 RX ORDER — MAGNESIUM SULFATE HEPTAHYDRATE 40 MG/ML
2 INJECTION, SOLUTION INTRAVENOUS ONCE
Status: COMPLETED | OUTPATIENT
Start: 2020-05-07 | End: 2020-05-07

## 2020-05-07 RX ORDER — GUAIFENESIN 100 MG/5ML
162 LIQUID (ML) ORAL
Status: COMPLETED | OUTPATIENT
Start: 2020-05-07 | End: 2020-05-07

## 2020-05-07 RX ORDER — ALBUTEROL SULFATE 90 UG/1
8 AEROSOL, METERED RESPIRATORY (INHALATION)
Status: DISCONTINUED | OUTPATIENT
Start: 2020-05-07 | End: 2020-05-07

## 2020-05-07 RX ORDER — ALBUTEROL SULFATE 90 UG/1
1 AEROSOL, METERED RESPIRATORY (INHALATION)
Qty: 1 INHALER | Refills: 0 | Status: SHIPPED | OUTPATIENT
Start: 2020-05-07

## 2020-05-07 RX ADMIN — IPRATROPIUM BROMIDE AND ALBUTEROL SULFATE 3 ML: .5; 3 SOLUTION RESPIRATORY (INHALATION) at 19:24

## 2020-05-07 RX ADMIN — MAGNESIUM SULFATE HEPTAHYDRATE 2 G: 40 INJECTION, SOLUTION INTRAVENOUS at 21:05

## 2020-05-07 RX ADMIN — IPRATROPIUM BROMIDE AND ALBUTEROL SULFATE 3 ML: .5; 3 SOLUTION RESPIRATORY (INHALATION) at 19:59

## 2020-05-07 RX ADMIN — Medication 162 MG: at 20:29

## 2020-05-07 RX ADMIN — IPRATROPIUM BROMIDE AND ALBUTEROL SULFATE 3 ML: .5; 3 SOLUTION RESPIRATORY (INHALATION) at 20:51

## 2020-05-07 NOTE — ED TRIAGE NOTES
Hx of asthma. Chest pain and dyspnea since 1800 while at work (unable to finish shift). Audible wheezing noted.

## 2020-05-07 NOTE — ED NOTES
Unable to acquire a BP via dynamap due to patient's inability to remain still. Taken for EKG interpretation. We retry BP upon arrival to ED bed.

## 2020-05-08 LAB
ATRIAL RATE: 84 BPM
CALCULATED P AXIS, ECG09: 41 DEGREES
CALCULATED R AXIS, ECG10: 27 DEGREES
CALCULATED T AXIS, ECG11: 15 DEGREES
DIAGNOSIS, 93000: NORMAL
P-R INTERVAL, ECG05: 120 MS
Q-T INTERVAL, ECG07: 382 MS
QRS DURATION, ECG06: 84 MS
QTC CALCULATION (BEZET), ECG08: 451 MS
VENTRICULAR RATE, ECG03: 84 BPM

## 2020-05-08 NOTE — ED PROVIDER NOTES
EMERGENCY DEPARTMENT HISTORY AND PHYSICAL EXAM      Date: 5/7/2020  Patient Name: Kannan Martinez    History of Presenting Illness     Chief Complaint   Patient presents with    Wheezing    Shortness of Breath       History Provided By: Patient    History: Kannan Martinez is a 62 y.o. female with asthma who presents with shortness of breath and wheezing which began acutely this evening while at work. She reports history of asthma with her last exacerbation 5 months ago. PCP: Mel, MD Enriqueta    Current Facility-Administered Medications   Medication Dose Route Frequency Provider Last Rate Last Dose    magnesium sulfate 2 g/50 ml IVPB (premix or compounded)  2 g IntraVENous Betty Shaikh MD 50 mL/hr at 05/07/20 2105 2 g at 05/07/20 2105     Current Outpatient Medications   Medication Sig Dispense Refill    diclofenac (VOLTAREN) 1 % gel Apply  to affected area four (4) times daily. 2 g 1    methocarbamoL (ROBAXIN) 500 mg tablet Take 1 Tab by mouth four (4) times daily. 90 Tab 0    aspirin 81 mg chewable tablet Take 1 Tab by mouth daily. 30 Tab 0    ferrous sulfate 325 mg (65 mg iron) tablet Take 1 Tab by mouth Daily (before breakfast). 30 Tab 0    cyanocobalamin (VITAMIN B-12) 1,000 mcg/mL injection 1 mL by IntraMUSCular route daily. Vitamin B 12 daily for seven days then every 3 days days for three weeks. 1 Vial 0    nortriptyline (PAMELOR) 25 mg capsule Take 50 mg by mouth nightly.  Comp. Stocking,Knee,Regular,Lrg misc 20-30 mm hg 2 Package 1    albuterol (PROVENTIL HFA) 90 mcg/actuation inhaler Take 2 Puffs by inhalation.          Past History     Past Medical History:  Past Medical History:   Diagnosis Date    Anemia     Arthritis     Asthma     Cervical neck pain with evidence of disc disease     Fibromyalgia     Migraines     Nocturnal leg cramps 1/19/2020    TIA (transient ischemic attack) 1/19/2020       Past Surgical History:  Past Surgical History:   Procedure Laterality Date    ABDOMEN SURGERY PROC UNLISTED      HX CHOLECYSTECTOMY      HX GASTRIC BYPASS      HX GYN      HX HYSTERECTOMY      HX ORTHOPAEDIC      L arm has steel bar        Family History:  Family History   Problem Relation Age of Onset    Cancer Mother     Heart Disease Sister     Heart Disease Father        Social History:  Social History     Tobacco Use    Smoking status: Former Smoker     Packs/day: 0.50     Last attempt to quit: 2015     Years since quittin.5    Smokeless tobacco: Never Used   Substance Use Topics    Alcohol use: No    Drug use: No       Allergies: Allergies   Allergen Reactions    Imitrex [Sumatriptan Succinate] Other (comments)     \"burned my skin\"    Penicillins Hives    Prednisolone Swelling         Review of Systems   Review of Systems   Constitutional: Positive for fatigue. Negative for chills and fever. HENT: Negative for congestion, postnasal drip, sinus pressure, sinus pain and sneezing. Eyes: Negative for visual disturbance. Respiratory: Positive for chest tightness, shortness of breath and wheezing. Negative for cough. Cardiovascular: Positive for chest pain and leg swelling. Negative for palpitations. Gastrointestinal: Negative for abdominal pain, diarrhea, nausea and vomiting. Endocrine: Negative for polydipsia and polyuria. Genitourinary: Negative for difficulty urinating. Musculoskeletal: Negative for arthralgias and myalgias. Neurological: Negative for dizziness, weakness, light-headedness and headaches. Hematological: Negative for adenopathy. Physical Exam     Vitals:    20 19320   BP: 139/55 136/57 (!) 82/63 138/67   Pulse: 76 80 (!) 104 (!) 109   Resp: 21 9 25 28   Temp:    98.3 °F (36.8 °C)   SpO2: 99% 100% 100% 100%     Physical Exam  Vitals signs and nursing note reviewed. Constitutional:       General: She is in acute distress. Appearance: She is well-developed.    HENT: Head: Normocephalic and atraumatic. Mouth/Throat:      Mouth: Mucous membranes are moist.      Pharynx: Oropharynx is clear. Eyes:      Extraocular Movements: Extraocular movements intact. Pupils: Pupils are equal, round, and reactive to light. Neck:      Musculoskeletal: Normal range of motion and neck supple. Cardiovascular:      Rate and Rhythm: Normal rate and regular rhythm. Pulmonary:      Effort: Tachypnea and accessory muscle usage present. Breath sounds: Decreased air movement present. Examination of the right-lower field reveals wheezing. Examination of the left-lower field reveals wheezing. Wheezing present. Chest:      Chest wall: Tenderness present. Abdominal:      General: Bowel sounds are normal.      Palpations: Abdomen is soft. Tenderness: There is no abdominal tenderness. Musculoskeletal: Normal range of motion. Right lower leg: No edema. Lymphadenopathy:      Cervical: No cervical adenopathy. Skin:     General: Skin is warm and dry. Capillary Refill: Capillary refill takes less than 2 seconds. Neurological:      General: No focal deficit present. Mental Status: She is alert.    Psychiatric:         Mood and Affect: Mood normal.         Behavior: Behavior normal.           Diagnostic Study Results     Labs -     Recent Results (from the past 12 hour(s))   EKG, 12 LEAD, INITIAL    Collection Time: 05/07/20  6:52 PM   Result Value Ref Range    Ventricular Rate 84 BPM    Atrial Rate 84 BPM    P-R Interval 120 ms    QRS Duration 84 ms    Q-T Interval 382 ms    QTC Calculation (Bezet) 451 ms    Calculated P Axis 41 degrees    Calculated R Axis 27 degrees    Calculated T Axis 15 degrees    Diagnosis       Normal sinus rhythm  Normal ECG  When compared with ECG of 18-JAN-2020 22:23,  Right bundle branch block is no longer present     CBC WITH AUTOMATED DIFF    Collection Time: 05/07/20  7:42 PM   Result Value Ref Range    WBC 9.6 4.6 - 13.2 K/uL RBC 4.73 4.20 - 5.30 M/uL    HGB 10.1 (L) 12.0 - 16.0 g/dL    HCT 33.3 (L) 35.0 - 45.0 %    MCV 70.4 (L) 74.0 - 97.0 FL    MCH 21.4 (L) 24.0 - 34.0 PG    MCHC 30.3 (L) 31.0 - 37.0 g/dL    RDW 17.3 (H) 11.6 - 14.5 %    PLATELET 333 575 - 459 K/uL    MPV 10.3 9.2 - 11.8 FL    NEUTROPHILS 67 40 - 73 %    LYMPHOCYTES 26 21 - 52 %    MONOCYTES 6 3 - 10 %    EOSINOPHILS 1 0 - 5 %    BASOPHILS 0 0 - 2 %    ABS. NEUTROPHILS 6.4 1.8 - 8.0 K/UL    ABS. LYMPHOCYTES 2.5 0.9 - 3.6 K/UL    ABS. MONOCYTES 0.6 0.05 - 1.2 K/UL    ABS. EOSINOPHILS 0.1 0.0 - 0.4 K/UL    ABS. BASOPHILS 0.0 0.0 - 0.1 K/UL    DF AUTOMATED         Radiologic Studies -   XR CHEST PORT    (Results Pending)     CT Results  (Last 48 hours)    None        CXR Results  (Last 48 hours)    None            Medical Decision Making   I am the first provider for this patient. I reviewed the vital signs, available nursing notes, past medical history, past surgical history, family history and social history. EKG: Interpreted by the EP. Time Interpreted: 2775   Rate: 84   Rhythm: sinus   Interpretation: normal sinus rhythm, no ST elevations or T wave changes   Comparison: 1/18/20    Interpreted by the EP. Time Interpreted: 4492   Rate: 96   Rhythm: sinus   Interpretation: normal sinus rhythm   Comparison: normal sinus rhythm, no ST elevations or T wave changes    Records Reviewed: Nursing Notes and Old Medical Records    ED Course:          Disposition:  Discharged home    DISCHARGE NOTE:   Pt has been reexamined. Patient has no new complaints, changes, or physical findings. Care plan outlined and precautions discussed. Results of labs and chest x-ray were reviewed with the patient. All medications were reviewed with the patient; will d/c home with albuterol inhaler. All of pt's questions and concerns were addressed. Patient was instructed and agrees to follow up with PCP, as well as to return to the ED upon further deterioration.  Patient is ready to go home.    Follow-up Information    None         Current Discharge Medication List          Provider Notes (Medical Decision Making):   Patient presented with shortness of breath and wheezing, onset 1 hour PTA. Breath sounds decreased bilaterally, patient tripoding, few word sentences. Given duoneb x3 with improvement. CXR unremarkable. Improved with duoneb. Hard stick - EJ accessed. No solumedrol due to prednisone anaphyalxis allergy. Given 2gm magnesium. Hx of hypocalcemia, hand mary with BP cuff inflation. Pt also has chest pain, better with sitting forward. No ST elevation on ECG. ASA given. WBC is within normal limits. NO productive cough. Not likley this is COPD exacerbation. No infectious findigns on workup. Patient feels safe to go home. Will refill her albuterol inhaler. Patient in sinus tachycardia into 140s after duo-neb treatment. Chest pain has resolved. Patient taking fluids PO which improved tachycardia. She reports hisotyr of sinus tachycardia. Upone re-eval, HR is in one-teens and BP is wnl. Patient ambulated to the bathroom without dizziness or lightheadedness. Patient is ready to go home. Diagnosis     Clinical Impression:   1.  Mild intermittent asthma with acute exacerbation

## 2020-05-08 NOTE — ED NOTES
Right EJ intact, patient resting in bed, breathing treatment completes, and no complaints of chest pain at this time. Christine Casillas

## 2020-05-14 ENCOUNTER — TELEPHONE (OUTPATIENT)
Dept: FAMILY MEDICINE CLINIC | Facility: CLINIC | Age: 55
End: 2020-05-14

## 2020-05-14 NOTE — TELEPHONE ENCOUNTER
Patient has been out of work with migraine and was seen at [facility] for injection. Her work is requiring a letter stating she suffers from migraines. Patient was advised to get a note from [facility] stating she was treated there today for Migraine, she is not sure they will give it to her. She says she could lose her job without a note. No availability for virtual visit until 5/22. Last seen 3/24. Please advise.

## 2020-05-20 ENCOUNTER — VIRTUAL VISIT (OUTPATIENT)
Dept: FAMILY MEDICINE CLINIC | Facility: CLINIC | Age: 55
End: 2020-05-20

## 2020-05-20 DIAGNOSIS — G43.911 INTRACTABLE MIGRAINE WITH STATUS MIGRAINOSUS, UNSPECIFIED MIGRAINE TYPE: Primary | ICD-10-CM

## 2020-05-20 RX ORDER — BUTALBITAL, ACETAMINOPHEN AND CAFFEINE 50; 325; 40 MG/1; MG/1; MG/1
1 TABLET ORAL
Qty: 28 TAB | Refills: 0 | Status: SHIPPED | OUTPATIENT
Start: 2020-05-20 | End: 2020-05-27 | Stop reason: SDUPTHER

## 2020-05-20 RX ORDER — PROMETHAZINE HYDROCHLORIDE 12.5 MG/1
12.5 TABLET ORAL
Qty: 28 TAB | Refills: 0 | Status: SHIPPED | OUTPATIENT
Start: 2020-05-20

## 2020-05-20 RX ORDER — TOPIRAMATE 25 MG/1
25 TABLET ORAL 2 TIMES DAILY WITH MEALS
Qty: 60 TAB | Refills: 0 | Status: SHIPPED | OUTPATIENT
Start: 2020-05-20 | End: 2020-06-17

## 2020-05-20 NOTE — PROGRESS NOTES
Nick Noriega is a 62 y.o. female who was seen by synchronous (real-time) audio-video technology on 5/20/2020. Consent: Nick Noriega, who was seen by synchronous (real-time) audio-video technology, and/or her healthcare decision maker, is aware that this patient-initiated, Telehealth encounter on 5/20/2020 is a billable service, with coverage as determined by her insurance carrier. She is aware that she may receive a bill and has provided verbal consent to proceed: Yes. Assessment & Plan:   Diagnoses and all orders for this visit:    1. Intractable migraine with status migrainosus, unspecified migraine type  -     butalbital-acetaminophen-caffeine (FIORICET, ESGIC) -40 mg per tablet; Take 1 Tab by mouth every six (6) hours as needed for Headache for up to 7 days. -     topiramate (TOPAMAX) 25 mg tablet; Take 1 Tab by mouth two (2) times daily (with meals). -     promethazine (PHENERGAN) 12.5 mg tablet; Take 1 Tab by mouth every six (6) hours as needed for Nausea. Fioricet prescription given  Topamax for maintenance  Phenergan given for nausea        Subjective:   Nick Noriega is a 62 y.o. female who was seen for Headache    The patient presents for an Audio-visual teleconference appointment for migraine. Migraine x4 days and the week before she was seen at a urgent care. Notes her migraine has been ongoing x3 days this week. Complaining of nausea but denies any vomiting. She does have a prior history of migraines. Prior to Admission medications    Medication Sig Start Date End Date Taking? Authorizing Provider   butalbital-acetaminophen-caffeine (FIORICET, ESGIC) -40 mg per tablet Take 1 Tab by mouth every six (6) hours as needed for Headache for up to 7 days. 5/20/20 5/27/20 Yes Edi Lauren NP   topiramate (TOPAMAX) 25 mg tablet Take 1 Tab by mouth two (2) times daily (with meals).  5/20/20  Yes Edi Lauren NP   promethazine (PHENERGAN) 12.5 mg tablet Take 1 Tab by mouth every six (6) hours as needed for Nausea. 5/20/20  Yes Nellie Shabazz NP   albuterol (PROVENTIL HFA, VENTOLIN HFA, PROAIR HFA) 90 mcg/actuation inhaler Take 1 Puff by inhalation every four (4) hours as needed for Wheezing or Shortness of Breath. 5/7/20   Serena Shah MD   diclofenac (VOLTAREN) 1 % gel Apply  to affected area four (4) times daily. 3/24/20   Nellie Shabazz NP   methocarbamoL (ROBAXIN) 500 mg tablet Take 1 Tab by mouth four (4) times daily. 3/24/20   Nellie Shabazz NP   aspirin 81 mg chewable tablet Take 1 Tab by mouth daily. 1/22/20   Shelbi Rosado MD   ferrous sulfate 325 mg (65 mg iron) tablet Take 1 Tab by mouth Daily (before breakfast). 1/21/20   Shelbi Rosado MD   cyanocobalamin (VITAMIN B-12) 1,000 mcg/mL injection 1 mL by IntraMUSCular route daily. Vitamin B 12 daily for seven days then every 3 days days for three weeks. 1/21/20   Shelbi Rosado MD   nortriptyline (PAMELOR) 25 mg capsule Take 50 mg by mouth nightly. Provider, Historical   Comp. Stocking,Knee,Regular,Lrg misc 20-30 mm hg 8/1/19   Nellie Shabazz NP     Allergies   Allergen Reactions    Imitrex [Sumatriptan Succinate] Other (comments)     \"burned my skin\"    Penicillins Hives    Prednisolone Swelling       Patient Active Problem List   Diagnosis Code    UTI (urinary tract infection) N39.0    Nausea and vomiting R11.2    Weakness of left upper extremity R29.898    TIA (transient ischemic attack) G45.9    S/P gastric bypass Z98.84    Recurrent pyelonephritis N12    Positive blood culture R78.81    Microcytic anemia D50.9    Fibromyalgia M79.7    Acute coronary syndrome (HCC) I24.9    Nocturnal leg cramps G47.62     Patient Active Problem List    Diagnosis Date Noted    UTI (urinary tract infection) 01/19/2020    Nausea and vomiting 01/19/2020    Weakness of left upper extremity 01/19/2020    TIA (transient ischemic attack) 01/19/2020    Nocturnal leg cramps 01/19/2020    Fibromyalgia 10/13/2017    Acute coronary syndrome ([facility] [address] 10/13/2017    S/P gastric bypass 08/04/2012    Recurrent pyelonephritis 08/04/2012    Positive blood culture 08/04/2012    Microcytic anemia 08/04/2012     Current Outpatient Medications   Medication Sig Dispense Refill    butalbital-acetaminophen-caffeine (FIORICET, ESGIC) -40 mg per tablet Take 1 Tab by mouth every six (6) hours as needed for Headache for up to 7 days. 28 Tab 0    topiramate (TOPAMAX) 25 mg tablet Take 1 Tab by mouth two (2) times daily (with meals). 60 Tab 0    promethazine (PHENERGAN) 12.5 mg tablet Take 1 Tab by mouth every six (6) hours as needed for Nausea. 28 Tab 0    albuterol (PROVENTIL HFA, VENTOLIN HFA, PROAIR HFA) 90 mcg/actuation inhaler Take 1 Puff by inhalation every four (4) hours as needed for Wheezing or Shortness of Breath. 1 Inhaler 0    diclofenac (VOLTAREN) 1 % gel Apply  to affected area four (4) times daily. 2 g 1    methocarbamoL (ROBAXIN) 500 mg tablet Take 1 Tab by mouth four (4) times daily. 90 Tab 0    aspirin 81 mg chewable tablet Take 1 Tab by mouth daily. 30 Tab 0    ferrous sulfate 325 mg (65 mg iron) tablet Take 1 Tab by mouth Daily (before breakfast). 30 Tab 0    cyanocobalamin (VITAMIN B-12) 1,000 mcg/mL injection 1 mL by IntraMUSCular route daily. Vitamin B 12 daily for seven days then every 3 days days for three weeks. 1 Vial 0    nortriptyline (PAMELOR) 25 mg capsule Take 50 mg by mouth nightly.  Comp. Stocking,Knee,Regular,Lrg misc 20-30 mm hg 2 Package 1     Allergies   Allergen Reactions    Imitrex [Sumatriptan Succinate] Other (comments)     \"burned my skin\"    Penicillins Hives    Prednisolone Swelling     Past Medical History:   Diagnosis Date    Anemia     Arthritis     Asthma     Cervical neck pain with evidence of disc disease     Fibromyalgia     Migraines     Nocturnal leg cramps 1/19/2020    TIA (transient ischemic attack) 1/19/2020 ROS      Constitutional: No apparent distress noted  General- negative for fever, chills or fatigue  Eyes- negative visual changes  CV- denies chest pain, palpitation  Pul: negative cough or SOB  GI: negative nausea, flank pain, diarrhea, constipation  Urinary:- No dysuria or polyuria  MS- negative myalgia, negative joint pain  Neuro- + headache  Skin- negative for rashes or lesions. Objective:   Vital Signs: (As obtained by patient/caregiver at home)  There were no vitals taken for this visit.      [INSTRUCTIONS:  \"[x]\" Indicates a positive item  \"[]\" Indicates a negative item  -- DELETE ALL ITEMS NOT EXAMINED]    Constitutional: [x] Appears well-developed and well-nourished [x] No apparent distress      [] Abnormal -     Mental status: [x] Alert and awake  [x] Oriented to person/place/time [x] Able to follow commands    [] Abnormal -     Eyes:   EOM    [x]  Normal    [] Abnormal -   Sclera  [x]  Normal    [] Abnormal -          Discharge [x]  None visible   [] Abnormal -     HENT: [x] Normocephalic, atraumatic  [] Abnormal -   [x] Mouth/Throat: Mucous membranes are moist    External Ears [x] Normal  [] Abnormal -    Neck: [x] No visualized mass [] Abnormal -     Pulmonary/Chest: [x] Respiratory effort normal   [x] No visualized signs of difficulty breathing or respiratory distress        [] Abnormal -      Musculoskeletal:   [x] Normal gait with no signs of ataxia         [x] Normal range of motion of neck        [] Abnormal -     Neurological:        [x] No Facial Asymmetry (Cranial nerve 7 motor function) (limited exam due to video visit)          [x] No gaze palsy        [] Abnormal -          Skin:        [x] No significant exanthematous lesions or discoloration noted on facial skin         [] Abnormal -            Psychiatric:       [x] Normal Affect [] Abnormal -        [x] No Hallucinations    Other pertinent observable physical exam findings:-        We discussed the expected course, resolution and complications of the diagnosis(es) in detail. Medication risks, benefits, costs, interactions, and alternatives were discussed as indicated. I advised her to contact the office if her condition worsens, changes or fails to improve as anticipated. She expressed understanding with the diagnosis(es) and plan. Mane Lemus is a 62 y.o. female who was evaluated by a video visit encounter for concerns as above. Patient identification was verified prior to start of the visit. A caregiver was present when appropriate. Due to this being a TeleHealth encounter (During [facility]-37 public health emergency), evaluation of the following organ systems was limited: Vitals/Constitutional/EENT/Resp/CV/GI//MS/Neuro/Skin/Heme-Lymph-Imm. Pursuant to the emergency declaration under the [facility] waiver authority and the [facility] [employer] Act, this Virtual  Visit was conducted, with patient's (and/or legal guardian's) consent, to reduce the patient's risk of exposure to COVID-19 and provide necessary medical care. Services were provided through a video synchronous discussion virtually to substitute for in-person clinic visit. Patient and provider were located at their individual homes.       Tiarra Capps NP

## 2020-05-20 NOTE — LETTER
NOTIFICATION RETURN TO WORK / SCHOOL 
 
5/20/2020 4:27 PM 
 
Ms. Katie Brasher [id number] [facility] [id number] To Whom It May Concern: 
 
Katie Brasher is currently under the care of [facility] CEL[facility] Ornelas. She will return to work/school on: May 27, 2020 If there are questions or concerns please have the patient contact our office. Sincerely, 
 
 
Roland Baldwin NP Please accept electronic signature.

## 2020-05-22 ENCOUNTER — PATIENT MESSAGE (OUTPATIENT)
Dept: FAMILY MEDICINE CLINIC | Facility: CLINIC | Age: 55
End: 2020-05-22

## 2020-05-27 DIAGNOSIS — G43.911 INTRACTABLE MIGRAINE WITH STATUS MIGRAINOSUS, UNSPECIFIED MIGRAINE TYPE: ICD-10-CM

## 2020-05-27 RX ORDER — BUTALBITAL, ACETAMINOPHEN AND CAFFEINE 50; 325; 40 MG/1; MG/1; MG/1
1 TABLET ORAL
Qty: 28 TAB | Refills: 0 | Status: SHIPPED | OUTPATIENT
Start: 2020-05-27 | End: 2020-06-03

## 2020-06-16 DIAGNOSIS — G43.911 INTRACTABLE MIGRAINE WITH STATUS MIGRAINOSUS, UNSPECIFIED MIGRAINE TYPE: ICD-10-CM

## 2020-06-17 RX ORDER — TOPIRAMATE 25 MG/1
TABLET ORAL
Qty: 60 TAB | Refills: 0 | Status: SHIPPED | OUTPATIENT
Start: 2020-06-17

## 2020-08-21 ENCOUNTER — HOSPITAL ENCOUNTER (OUTPATIENT)
Dept: MRI IMAGING | Age: 55
Discharge: HOME | End: 2020-08-21
Attending: PHYSICIAN ASSISTANT
Payer: SELF-PAY

## 2020-08-21 DIAGNOSIS — S43.401A SPRAIN OF SHOULDER, RIGHT: ICD-10-CM

## 2020-08-21 DIAGNOSIS — M25.511 RIGHT SHOULDER PAIN: ICD-10-CM

## 2020-08-21 PROCEDURE — 73221 MRI JOINT UPR EXTREM W/O DYE: CPT

## 2022-03-18 PROBLEM — M79.7 FIBROMYALGIA: Status: ACTIVE | Noted: 2017-10-13

## 2022-03-18 PROBLEM — R29.898 WEAKNESS OF LEFT UPPER EXTREMITY: Status: ACTIVE | Noted: 2020-01-19

## 2022-03-18 PROBLEM — I24.9 ACUTE CORONARY SYNDROME (HCC): Status: ACTIVE | Noted: 2017-10-13

## 2022-03-19 PROBLEM — G47.62 NOCTURNAL LEG CRAMPS: Status: ACTIVE | Noted: 2020-01-19

## 2022-03-19 PROBLEM — N39.0 UTI (URINARY TRACT INFECTION): Status: ACTIVE | Noted: 2020-01-19

## 2022-03-19 PROBLEM — R11.2 NAUSEA AND VOMITING: Status: ACTIVE | Noted: 2020-01-19

## 2022-03-20 PROBLEM — G45.9 TIA (TRANSIENT ISCHEMIC ATTACK): Status: ACTIVE | Noted: 2020-01-19

## 2023-05-25 RX ORDER — FERROUS SULFATE 325(65) MG
325 TABLET ORAL
COMMUNITY
Start: 2020-01-21

## 2023-05-25 RX ORDER — ASPIRIN 81 MG/1
81 TABLET, CHEWABLE ORAL DAILY
COMMUNITY
Start: 2020-01-22

## 2023-05-25 RX ORDER — METHOCARBAMOL 500 MG/1
500 TABLET, FILM COATED ORAL 4 TIMES DAILY
COMMUNITY
Start: 2020-03-24

## 2023-05-25 RX ORDER — ALBUTEROL SULFATE 90 UG/1
1 AEROSOL, METERED RESPIRATORY (INHALATION) EVERY 4 HOURS PRN
COMMUNITY
Start: 2020-05-07

## 2023-05-25 RX ORDER — TOPIRAMATE 25 MG/1
1 TABLET ORAL 2 TIMES DAILY WITH MEALS
COMMUNITY
Start: 2020-06-17

## 2023-05-25 RX ORDER — NORTRIPTYLINE HYDROCHLORIDE 25 MG/1
50 CAPSULE ORAL NIGHTLY
COMMUNITY

## 2023-05-25 RX ORDER — CYANOCOBALAMIN 1000 UG/ML
1000 INJECTION, SOLUTION INTRAMUSCULAR; SUBCUTANEOUS DAILY
COMMUNITY
Start: 2020-01-21

## 2023-05-25 RX ORDER — PROMETHAZINE HYDROCHLORIDE 12.5 MG/1
12.5 TABLET ORAL EVERY 6 HOURS PRN
COMMUNITY
Start: 2020-05-20

## 2023-06-02 NOTE — ED NOTES
Assumed care of pt [8] : 8 [6] : 6 [] : yes [Constant] : constant [Household chores] : household chores [Leisure] : leisure [Work] : work [Physical therapy] : physical therapy [Lying in bed] : lying in bed [Retired] : Work status: retired [de-identified] : 06/02/2023: Patient presenting for a cervical follow up. Patient has been completing OT / PT which has been helpful. states pain leveled out from a 9/10 to a 7/10. Taking Meloxicam which does reduce severity of pains. C/o pain, tingling, numbness in the digits and forearm. Symptoms present in the b/l upper extremities (R > L). Using bone stim. She continues to follow up with Dr. Bustillos for shoulder. Pre op symptoms were left > right - States character of this pain is different compared to her pre op pain. \par Also c/o subjective weakness in the right hand. \par \par 4/21/2023: Patient presents in follow up and review of EMG study. Completing OT which is helpful for symptom control. Severity of pain worse at nighttime. Meloxicam reduces the severity of her pains. Chief complaint is burning sensation and radiating pain in the upper extremity.  Reaction to Gabapentin in the past. \par Pain Level at Rest: 7/10\par Pain w/ activity: 8/10 -> pain increases at night\par Tx: Pt needs a new prescription for Occupational Therapy\par \par 03/17/2023: Pt reports that she has burning pain radiating up to the RT elbow.  Pain is intense, and has trouble initiating activity.  Pt denies Hx of diabetes.  Pt reports blood sugar levels are 84.  meloxicam does not help.\par \par 03/03/2023 - 63 y/o F presenting 3rd post op ACDF (10/18/2022). She reports new symptom starting approx 1.5 week ago. She c/o numbness and paresthesias in the forearms and hands. She finds this symptom improves after completing PT. She c/o shooting pain starting at the right elbow into the right hand. Prior radicular symptoms extending throughout the entire upper extremity resolved. \par \par Pain Level at rest: 7/10\par Pain Level with activity: 8-9/10 \par \par Symptoms:  pain radiating to down to the arms and hands, numbness, tingling\par \par Treatment: on PT\par \par Medications: Meloxicam not helpful\par \par \par \par 03/03/2023 - 63 y/o F presenting 3rd post op ACDF (10/18/2022). She reports new symptom starting approx 1.5 week ago. She c/o numbness and paresthesias in the forearms and hands. She finds this symptom improves after completing PT. She c/o shooting pain starting at the right elbow into the right hand. Prior radicular symptoms extending throughout the entire upper extremity resolved. \par \par 01/13/2023 - 63 y/o female presenting for a 2nd post op ACDF (10/18/2022). States she is doing well since her last visit. She reports improvement in ROM of the neck, still experiencing stiffness but improving. Also reports improvement of strength in the left upper extremity, as well as improved tingling in the hands. PT transient in her symptom improvement. She has continued treatment with bone stim daily for 30 minutes. \par \par She also reports recent aggravated low back pain. \par --------------------------------------------------------------\par 11/25/2022 - 62 y/o female presenting for a 1st post op ACDF. Using hot compresses prn on the neck. She reports improvement in pain/ tinging  in the upper extremity. Swallowing is normal. Denies incisional pain. Patient is pleased with her progress from preoperative baseline. \par \par *Lupus and asthma [FreeTextEntry7] : from shoulder down to forearm [de-identified] : PT, HEP, Acupuncture

## 2023-11-10 NOTE — PROGRESS NOTES
[facility] January 21, 2020       RE: Eloy Ramos      To Whom It May Concern,    This is to certify that Eloy Ramos was hospitalized from 1/18/20 to 1/21/10 and  may may return to work on 01/27/20. Please feel free to contact my office if you have any questions or concerns. Thank you for your assistance in this matter.       Sincerely,  King Wisam MD Assessment/Plan:    Obesity, Class III, BMI 40-49.9 (morbid obesity) ([address])  -Discussed options of HealthyCORE-Intensive Lifestyle Intervention Program, Very Low Calorie Diet-VLCD, Conservative Program, Evangelist-En-Y Gastric Bypass, and Vertical Sleeve Gastrectomy and the role of weight loss medications. Explained the importance of making lifestyle changes if utilizing medication to aid in weight loss  -Initial weight loss goal of 5-10% weight loss for improved health  -Screening labs and records reviewed from prior  - STOP BANG-2/8    -Patient is interested in pursuing conservative plan    Goals:  -Food log (ie.) [email or web address],[email or web address]1300  - To drink at least 64oz of water daily. No sugary beverages.  -Increase physical activity by 10 minutes daily. Gradually increase physical activity to a goal of 5 days per week for 30 minutes of MODERATE intensity PLUS 2 days per week of FULL BODY resistance training    Discussed medication options and will need to avoid phentermine due to adderall use. She was on wellbutrin prior and had side effects. To start on metformin for weight loss and prediabetes. Side effects discussed. Initial ALQXIB:176   Goal Weight:145lb        ADHD  On adderall    Prediabetes  To start metformin . Side effects discussed      Follow up in approximately  6 week nurse visit and 4 month   with Non-Surgical Physician/Advanced Practitioner. Diagnoses and all orders for this visit:    Obesity, Class III, BMI 40-49.9 (morbid obesity) ([facility])    Prediabetes  -     metFORMIN (GLUCOPHAGE-XR) 750 mg 24 hr tablet; Take 1 tablet a day for 1 week and then take 2 tablets    ADHD    Other orders  -     hydrOXYzine HCL (ATARAX) 10 mg tablet; Take by mouth  -     Albuterol Sulfate 108 (90 Base) MCG/ACT AEPB  -     amoxicillin (AMOXIL) 500 mg capsule;  Take 500 mg by mouth 2 (two) times a day  -     amphetamine-dextroamphetamine (ADDERALL, 20MG,) 20 mg tablet  -     cetirizine (ZyrTEC) 10 mg tablet; Take 10 mg by mouth daily  -     cholecalciferol (VITAMIN D3) 400 units tablet; Take by mouth daily  -     PARAGARD INTRAUTERINE COPPER IU  -     mometasone (ELOCON) 0.1 % lotion; Apply 1 application. topically daily          Subjective:   Chief Complaint   Patient presents with    Consult     MWM Consult; Gw-145lb ; Fidelia Vazquez; Stop Bang-2/8       Patient ID: Rj Chau  is a 32 y.o. female with excess weight/obesity here to pursue weight management. History reviewed. No pertinent past medical history. HPI: Here for MWM consult    She tried food logging in the past but was sometimes overly restrivitve and then would get frustrated with weight loss. She does eat well. She tires to eat lean protein and vegetables  She does get cravings before her periods. Also does do some emotional. Tries to eat when hungry and listening to body cues. Her mom did have weight loss surgery and she was often chastising her about her weight. She does take adderall on workdays and it decreases her appetite. Obesity/Excess Weight:  Severity:  class III  Onset:  years    Modifiers: Diet and Exercise  Contributing factors: Insufficient Physical Activity and Stress/Emotional Eating    Goals:145lb  Hydration:1/2 gal or more water a day, black coffee  Alcohol: max 1 glass wine week  Exercise:nothing formal right now. 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   She   Patient Active Problem List    Diagnosis Date Noted    Obesity, Class III, BMI 40-49.9 (morbid obesity) ([address]) 11/10/2023    Asthma 11/10/2023    ADHD 11/10/2023    Prediabetes 11/10/2023    Eczema 12/21/2015    Allergic rhinitis 03/05/2013     She  has no past surgical history on file. Her family history is not on file. She  reports that she has never smoked. She has never used smokeless tobacco. She reports that she does not drink alcohol and does not use drugs. Current Outpatient Medications   Medication Sig Dispense Refill    Albuterol Sulfate 108 (90 Base) MCG/ACT AEPB       amoxicillin (AMOXIL) 500 mg capsule Take 500 mg by mouth 2 (two) times a day      amphetamine-dextroamphetamine (ADDERALL, 20MG,) 20 mg tablet       cetirizine (ZyrTEC) 10 mg tablet Take 10 mg by mouth daily      cholecalciferol (VITAMIN D3) 400 units tablet Take by mouth daily      hydrOXYzine HCL (ATARAX) 10 mg tablet Take by mouth      metFORMIN (GLUCOPHAGE-XR) 750 mg 24 hr tablet Take 1 tablet a day for 1 week and then take 2 tablets 60 tablet 2    mometasone (ELOCON) 0.1 % lotion Apply 1 application. topically daily      PARAGARD INTRAUTERINE COPPER IU        No current facility-administered medications for this visit. Current Outpatient Medications on File Prior to Visit   Medication Sig    Albuterol Sulfate 108 (90 Base) MCG/ACT AEPB     amoxicillin (AMOXIL) 500 mg capsule Take 500 mg by mouth 2 (two) times a day    amphetamine-dextroamphetamine (ADDERALL, 20MG,) 20 mg tablet     cetirizine (ZyrTEC) 10 mg tablet Take 10 mg by mouth daily    cholecalciferol (VITAMIN D3) 400 units tablet Take by mouth daily    hydrOXYzine HCL (ATARAX) 10 mg tablet Take by mouth    mometasone (ELOCON) 0.1 % lotion Apply 1 application. topically daily    PARAGARD INTRAUTERINE COPPER IU      No current facility-administered medications on file prior to visit. She has No Known Allergies. .    Review of Systems   Constitutional:  Negative for fever. Respiratory:  Negative for shortness of breath. Cardiovascular:  Negative for chest pain and palpitations.    Gastrointestinal:  Negative for abdominal pain, constipation, diarrhea and vomiting. Genitourinary:  Negative for difficulty urinating. Skin:  Negative for rash. Neurological:  Negative for headaches. Psychiatric/Behavioral:  Negative for dysphoric mood. The patient is not nervous/anxious. Objective:    /87   Pulse 91   Resp 16   Ht 5' 2" (1.575 m)   Wt 102 kg (224 lb)   BMI 40.97 kg/m²     Physical Exam  Vitals and nursing note reviewed. Constitutional:       General: She is not in acute distress. Appearance: She is well-developed. She is obese. HENT:      Head: Normocephalic and atraumatic. Eyes:      Conjunctiva/sclera: Conjunctivae normal.   Neck:      Thyroid: No thyromegaly. Pulmonary:      Effort: Pulmonary effort is normal. No respiratory distress. Skin:     Findings: No rash (visible). Neurological:      Mental Status: She is alert and oriented to person, place, and time.    Psychiatric:         Mood and Affect: Mood normal.         Behavior: Behavior normal.